# Patient Record
Sex: FEMALE | Race: WHITE | NOT HISPANIC OR LATINO | Employment: FULL TIME | URBAN - METROPOLITAN AREA
[De-identification: names, ages, dates, MRNs, and addresses within clinical notes are randomized per-mention and may not be internally consistent; named-entity substitution may affect disease eponyms.]

---

## 2017-06-30 ENCOUNTER — TELEPHONE (OUTPATIENT)
Dept: PULMONOLOGY | Facility: CLINIC | Age: 37
End: 2017-06-30

## 2017-06-30 NOTE — TELEPHONE ENCOUNTER
----- Message from Lilian Alcaraz sent at 6/30/2017 11:22 AM CDT -----  Contact: pt  She's coming in for sleep lab, please schedule xray and PFT in needed. Please call pt at [phone]. Thank you

## 2017-07-12 ENCOUNTER — OFFICE VISIT (OUTPATIENT)
Dept: PULMONOLOGY | Facility: CLINIC | Age: 37
End: 2017-07-12
Payer: COMMERCIAL

## 2017-07-12 VITALS
DIASTOLIC BLOOD PRESSURE: 68 MMHG | HEIGHT: 64 IN | BODY MASS INDEX: 28.83 KG/M2 | WEIGHT: 168.88 LBS | HEART RATE: 60 BPM | OXYGEN SATURATION: 99 % | RESPIRATION RATE: 20 BRPM | SYSTOLIC BLOOD PRESSURE: 100 MMHG

## 2017-07-12 DIAGNOSIS — Z87.19 HISTORY OF GASTROESOPHAGEAL REFLUX (GERD): ICD-10-CM

## 2017-07-12 DIAGNOSIS — Z98.890 HISTORY OF NISSEN FUNDOPLICATION: ICD-10-CM

## 2017-07-12 DIAGNOSIS — K21.9 GASTROESOPHAGEAL REFLUX DISEASE, ESOPHAGITIS PRESENCE NOT SPECIFIED: Primary | ICD-10-CM

## 2017-07-12 PROCEDURE — 99204 OFFICE O/P NEW MOD 45 MIN: CPT | Mod: S$GLB,,, | Performed by: NURSE PRACTITIONER

## 2017-07-12 PROCEDURE — 99999 PR PBB SHADOW E&M-EST. PATIENT-LVL IV: CPT | Mod: PBBFAC,,, | Performed by: NURSE PRACTITIONER

## 2017-07-12 RX ORDER — OMEPRAZOLE 40 MG/1
40 CAPSULE, DELAYED RELEASE ORAL DAILY
Qty: 30 CAPSULE | Refills: 3 | Status: SHIPPED | OUTPATIENT
Start: 2017-07-12 | End: 2021-11-17 | Stop reason: ALTCHOICE

## 2017-07-12 NOTE — PATIENT INSTRUCTIONS
Tips to Control Acid Reflux    To control acid reflux, youll need to make some basic diet and lifestyle changes. The simple steps outlined below may be all youll need to ease discomfort.  Watch what you eat  · Avoid fatty foods and spicy foods.  · Eat fewer acidic foods, such as citrus and tomato-based foods. These can increase symptoms.  · Limit drinking alcohol, caffeine, and fizzy beverages. All increase acid reflux.  · Try limiting chocolate, peppermint, and spearmint. These can worsen acid reflux in some people.  Watch when you eat  · Avoid lying down for 3 hours after eating.  · Do not snack before going to bed.  Raise your head  Raising your head and upper body by 4 to 6 inches helps limit reflux when youre lying down. Put blocks under the head of your bed frame to raise it.  Other changes  · Lose weight, if you need to  · Dont exercise near bedtime  · Avoid tight-fitting clothes  · Limit aspirin and ibuprofen  · Stop smoking   Date Last Reviewed: 7/1/2016 © 2000-2016 [employer]. [address] [place] [address]. All rights reserved. This information is not intended as a substitute for professional medical care. Always follow your healthcare professional's instructions.        Discharge Instructions for Gastroesophageal Reflux Disease (GERD)  Gastroesophageal reflux disease (GERD) is a backflow of acid from the stomach into the swallowing tube (esophagus).  Home care  These home care steps can help you manage GERD:  · Maintain a healthy weight. Get help to lose any extra pounds.  · Avoid lying down after meals.  · Avoid eating late at night.  · Elevate the head of your bed by 6 inches. You can do this by placing wooden blocks or bed risers under the head of your bed.  · Avoid wearing tight-fitting clothes.  · Avoid foods that might irritate your stomach, such as the following:  ¨ Alcohol  ¨ Fat  ¨ Chocolate  ¨ Caffeine  ¨ Spearmint or peppermint  · Talk to your healthcare  provider if you are taking any of the following medicines. These medicines can make GERD symptoms worse:  ¨ Calcium channel blockers  ¨ Theophylline  ¨ Anticholinergic medicines, such as oxybutynin and benzatropine  · Begin an exercise program. Ask your healthcare provider how to get started. You can benefit from simple activities, such as walking or gardening.  · Break the smoking habit. Enroll in a stop-smoking program to improve your chances of success.  · Limit alcohol intake to no more than 2 drinks a day.  · Take your medicines exactly as directed. Dont skip doses.  · Avoid over-the-counter nonsteroidal anti-inflammatory medicines, such as aspirin and ibuprofen, unless recommended by your healthcare provider for certain conditions.   · If possible, avoid nitrates (heart medicines, such as nitroglycerin and isosorbide dinitrate ).  Follow-up care  Make a follow-up appointment as directed by our staff.     When to call the healthcare provider  Call your healthcare provider immediately if you have any of the following:  · Trouble swallowing  · Pain when swallowing  · Feeling of food caught in your chest or throat  · Pain in the neck, chest, or back  · Heartburn that causes you to vomit  · Vomiting blood  · Black or tarry stools (from digested blood)  · More saliva (watering of the mouth) than usual  · Weight loss of more than 3% to 5% of your total body weight in a month  · Hoarseness or sore throat that wont go away  · Choking, coughing, or wheezing   Date Last Reviewed: 7/1/2016  © 7926-0760 [employer]. [address] [place] [address], [place] [address]. All rights reserved. This information is not intended as a substitute for professional medical care. Always follow your healthcare professional's instructions.

## 2017-07-12 NOTE — PROGRESS NOTES
Subjective:      Patient ID: Anahy Mata is a 37 y.o. female.    Patient Active Problem List   Diagnosis    History of gastroesophageal reflux (GERD)     Problem list has been reviewed.    she has been referred by Self, [facility] for evaluation and management for   Chief Complaint   Patient presents with    Sleep Apnea     Chief Complaint: Sleep Apnea    HPI:  She presents for a sleep evaluation patient reports she is having a sensation of fullness or something stuck in the back of her throat during the night over the past 3 months.  She awakens several times during the night most common time is around 2-3 am when sensation of something in her throat, when she drinks water there is some relief.   Her awakens are from 0-3 times a night.  There is history of Nissen fundoplication in about 2005 for severe GERD.   She is not having burning sensation when foreign body in back of throat.  She actually reaches a finger in back of throat thinking there is something to remove, drinks water and relieves sensation in back of throat.   No sensation of post nasal drip, no rhinorrhea. There is no cough, no wheezing, no shortness of breath, no sputum production, no chest pain.  No snoring or gasping for air at night.  She presents self referred since friends and coworkers directed her to sleep evaluation.  She has not seen her Primary Care Provider for physical in a long time.   Patient reports non restful' sleep.  She denies morning headache.   Shedenies impairment of activity during wakefulness.  She denies day time napping.  [place] sleepiness score was 4.  Neck circumference is 37 cm. (14 inches).  She denies recent weight gain.  Mallampati score 1  Cardiovascular risk factors: obesity  Bed time is 0930, 1000  Wake time is 0600  Sleep onset is within 30 Minutes.  Sleep maintenance difficulties related to frequent night time awakening and non-restful sleep over past 3 months with foreign body sensation in back of  throat  Wake after sleep onset occurs none to three times a night over past 3 months.  Nocturia occurs one time a night,   Sleep aids : No  Dry mouth : No,   Sleep walking: No,   Sleep talking : No,   Sleep eating:No  Vivid Dreams : No,   Cataplexy : No,   Hypnogogic hallucinations:  No    STOP - BANG Questionnaire:     1. Snoring : Do you snore loudly ?    No  2. Tired : Do you often feel tired, fatigued, or sleepy during daytime? No  3. Observed: Has anyone observed you stop breathing during your sleep?    No   4. Blood pressure : Do you have or are you being treated for high blood pressure?   No  5. BMI :BMI more than 35 kg/m2?   No  6. Age : Age over 50 yr old?   No  7. Neck circumference: Neck circumference greater than 40 cm?   No  8. Gender: Gender male?   No    SCORE: 0    High risk of SANJU: Yes 5 - 8  Intermediate risk of SANJU: Yes 3 - 4  Low risk of SANJU: Yes 0 - 2    Previous Report Reviewed: historical medical records, lab reports and office notes     Past Medical History: The following portions of the patient's history were reviewed and updated as appropriate:   She  has a past surgical history that includes Tubal ligation; Cholecystectomy; and Nissen fundoplication (2005).  Her family history includes Hypertension in her mother; No Known Problems in her father.  She  reports that she quit smoking about 6 years ago. She has never used smokeless tobacco. She reports that she does not drink alcohol or use drugs.  She has a current medication list which includes the following prescription(s): omeprazole.  She has No Known Allergies..    Review of Systems   Constitutional: Negative for fever, chills, weight loss, weight gain, activity change, appetite change, fatigue and night sweats.   HENT: Negative for postnasal drip, rhinorrhea, sinus pressure, voice change and congestion.    Eyes: Negative for redness and itching.   Respiratory: Negative for snoring, cough, sputum production, chest tightness, shortness of  "breath, wheezing, orthopnea, asthma nighttime symptoms, dyspnea on extertion, use of rescue inhaler and somnolence.    Cardiovascular: Negative for chest pain, palpitations and leg swelling.   Genitourinary: Negative for difficulty urinating and hematuria.   Endocrine: Negative for polydipsia, polyphagia, cold intolerance, heat intolerance and polyuria.    Musculoskeletal: Negative for arthralgias, back pain, gait problem, joint swelling and myalgias.   Skin: Negative.    Neurological: Negative for dizziness, weakness, light-headedness and headaches.   Hematological: Negative for adenopathy. No excessive bruising.   Psychiatric/Behavioral: Negative for sleep disturbance. The patient is not nervous/anxious.       Objective:     Physical Exam   Constitutional: She is oriented to person, place, and time. Vital signs are normal. She appears well-developed and well-nourished. She is active and cooperative.  Non-toxic appearance. She does not appear ill. No distress.   HENT:   Head: Normocephalic.   Right Ear: External ear normal.   Left Ear: External ear normal.   Nose: Nose normal.   Mouth/Throat: Oropharynx is clear and moist. No oropharyngeal exudate.   Eyes: Conjunctivae are normal.   Neck: Normal range of motion. Neck supple.   Cardiovascular: Normal rate, regular rhythm, normal heart sounds and intact distal pulses.    Pulmonary/Chest: Effort normal and breath sounds normal.   Abdominal: Soft. There is no tenderness.   Musculoskeletal: Normal range of motion.   Neurological: She is alert and oriented to person, place, and time.   Skin: Skin is warm and dry.   Psychiatric: She has a normal mood and affect. Her behavior is normal. Judgment and thought content normal.   Vitals reviewed.    Vitals:    07/12/17 1505   BP: 100/68   Pulse: 60   Resp: 20   SpO2: 99%   Weight: 76.6 kg (168 lb 14 oz)   Height: 5' 3.6" (1.615 m)     Body mass index is 29.35 kg/m².    Personal Diagnostic Review  none    Assessment:     1. " Gastroesophageal reflux disease, esophagitis presence not specified    2. History of gastroesophageal reflux (GERD)    3. History of Nissen fundoplication      Orders Placed This Encounter   Procedures    Ambulatory referral to Gastroenterology     Referral Priority:   Routine     Referral Type:   Consultation     Referral Reason:   Specialty Services Required     Requested Specialty:   Gastroenterology     Number of Visits Requested:   1     Plan:     Discussed diagnosis, its evaluation, treatment and usual course. All questions answered.    Gastroesophageal reflux disease, esophagitis presence not specified  -     omeprazole (PRILOSEC) 40 MG capsule; Take 1 capsule (40 mg total) by mouth once daily.  Dispense: 30 capsule; Refill: 3  -     Ambulatory referral to Gastroenterology    History of gastroesophageal reflux (GERD)  -     Ambulatory referral to Gastroenterology    History of Nissen fundoplication  -     Ambulatory referral to Gastroenterology       TIME SPENT WITH PATIENT: Time spent:40 minutes in face to face  discussion concerning diagnosis, prognosis, review of lab and test results, benefits of treatment as well as management of disease, counseling of patient and coordination of care between various health  care providers . Greater than half the time spent was used for coordination of care and counseling of patient.     Patient is low risk for sleep apnea, she is symptomatic for reoccurrence of gerd.   She is to begin Prilosec daily, follow up with gastro.  If not improving with treatment over next 5-7 days with prilosec and other conservative treatment for GERD then she is to begin Flonase and Zyrtec to determine if postnasal drip as a cause for her lump sensation in her throat at night.    Return if symptoms worsen or fail to improve.

## 2019-05-29 ENCOUNTER — OCCUPATIONAL HEALTH (OUTPATIENT)
Dept: URGENT CARE | Facility: CLINIC | Age: 39
End: 2019-05-29

## 2019-05-29 DIAGNOSIS — Z02.83 ENCOUNTER FOR DRUG SCREENING: Primary | ICD-10-CM

## 2019-05-29 PROCEDURE — 80305 OOH COLLECTION ONLY DRUG SCREEN: ICD-10-PCS | Mod: S$GLB,,, | Performed by: PHYSICIAN ASSISTANT

## 2019-05-29 PROCEDURE — 80305 DRUG TEST PRSMV DIR OPT OBS: CPT | Mod: S$GLB,,, | Performed by: PHYSICIAN ASSISTANT

## 2021-11-17 ENCOUNTER — OFFICE VISIT (OUTPATIENT)
Dept: INTERNAL MEDICINE | Facility: CLINIC | Age: 41
End: 2021-11-17
Payer: COMMERCIAL

## 2021-11-17 DIAGNOSIS — J01.00 ACUTE NON-RECURRENT MAXILLARY SINUSITIS: Primary | ICD-10-CM

## 2021-11-17 PROCEDURE — 99213 OFFICE O/P EST LOW 20 MIN: CPT | Mod: 95,,, | Performed by: NURSE PRACTITIONER

## 2021-11-17 PROCEDURE — 1159F MED LIST DOCD IN RCRD: CPT | Mod: CPTII,95,, | Performed by: NURSE PRACTITIONER

## 2021-11-17 PROCEDURE — 1160F RVW MEDS BY RX/DR IN RCRD: CPT | Mod: CPTII,95,, | Performed by: NURSE PRACTITIONER

## 2021-11-17 PROCEDURE — 1160F PR REVIEW ALL MEDS BY PRESCRIBER/CLIN PHARMACIST DOCUMENTED: ICD-10-PCS | Mod: CPTII,95,, | Performed by: NURSE PRACTITIONER

## 2021-11-17 PROCEDURE — 99213 PR OFFICE/OUTPT VISIT, EST, LEVL III, 20-29 MIN: ICD-10-PCS | Mod: 95,,, | Performed by: NURSE PRACTITIONER

## 2021-11-17 PROCEDURE — 1159F PR MEDICATION LIST DOCUMENTED IN MEDICAL RECORD: ICD-10-PCS | Mod: CPTII,95,, | Performed by: NURSE PRACTITIONER

## 2021-11-17 RX ORDER — AMOXICILLIN AND CLAVULANATE POTASSIUM 875; 125 MG/1; MG/1
1 TABLET, FILM COATED ORAL EVERY 12 HOURS
Qty: 14 TABLET | Refills: 0 | Status: SHIPPED | OUTPATIENT
Start: 2021-11-17 | End: 2021-11-24

## 2021-11-17 RX ORDER — METHYLPREDNISOLONE 4 MG/1
TABLET ORAL
Qty: 21 TABLET | Refills: 0 | Status: SHIPPED | OUTPATIENT
Start: 2021-11-17 | End: 2022-10-18

## 2022-02-11 ENCOUNTER — TELEPHONE (OUTPATIENT)
Dept: OTOLARYNGOLOGY | Facility: CLINIC | Age: 42
End: 2022-02-11
Payer: COMMERCIAL

## 2022-02-21 PROBLEM — J01.00 ACUTE NON-RECURRENT MAXILLARY SINUSITIS: Status: RESOLVED | Noted: 2021-11-17 | Resolved: 2022-02-21

## 2022-10-18 ENCOUNTER — OFFICE VISIT (OUTPATIENT)
Dept: INTERNAL MEDICINE | Facility: CLINIC | Age: 42
End: 2022-10-18
Payer: COMMERCIAL

## 2022-10-18 DIAGNOSIS — J32.9 SINUSITIS, UNSPECIFIED CHRONICITY, UNSPECIFIED LOCATION: Primary | ICD-10-CM

## 2022-10-18 PROCEDURE — 99213 PR OFFICE/OUTPT VISIT, EST, LEVL III, 20-29 MIN: ICD-10-PCS | Mod: 95,,, | Performed by: NURSE PRACTITIONER

## 2022-10-18 PROCEDURE — 99213 OFFICE O/P EST LOW 20 MIN: CPT | Mod: 95,,, | Performed by: NURSE PRACTITIONER

## 2022-10-18 RX ORDER — FLUTICASONE PROPIONATE 50 MCG
2 SPRAY, SUSPENSION (ML) NASAL DAILY
Qty: 16 G | Refills: 0 | Status: SHIPPED | OUTPATIENT
Start: 2022-10-18 | End: 2022-11-15

## 2022-10-18 RX ORDER — METHYLPREDNISOLONE 4 MG/1
TABLET ORAL
Qty: 21 TABLET | Refills: 0 | Status: SHIPPED | OUTPATIENT
Start: 2022-10-18 | End: 2022-11-15

## 2022-10-18 RX ORDER — AMOXICILLIN 875 MG/1
875 TABLET, FILM COATED ORAL 2 TIMES DAILY
Qty: 20 TABLET | Refills: 0 | Status: SHIPPED | OUTPATIENT
Start: 2022-10-18 | End: 2022-10-28

## 2022-10-18 NOTE — PROGRESS NOTES
Subjective:       Patient ID: Anahy Mata is a 42 y.o. female.    Chief Complaint: URI    The patient location is: home  The chief complaint leading to consultation is: uri    Visit type: audiovisual    Face to Face time with patient: 15 min  20 minutes of total time spent on the encounter, which includes face to face time and non-face to face time preparing to see the patient (eg, review of tests), Obtaining and/or reviewing separately obtained history, Documenting clinical information in the electronic or other health record, Independently interpreting results (not separately reported) and communicating results to the patient/family/caregiver, or Care coordination (not separately reported).         Each patient to whom he or she provides medical services by telemedicine is:  (1) informed of the relationship between the physician and patient and the respective role of any other health care provider with respect to management of the patient; and (2) notified that he or she may decline to receive medical services by telemedicine and may withdraw from such care at any time.    Notes:   Patient doing virtual visit for Upper Respiratory Infection x 11 days  Has been taking otc cold med  Having left ear pain, worsening 4/10  Has post nasal drip   Does report some sinus pain  She is taking alkaseltzer cold and sinus, ibuprofen  Occasional dry cough      URI   Associated symptoms include coughing, ear pain, headaches, rhinorrhea and a sore throat. Pertinent negatives include no abdominal pain, diarrhea, neck pain, rash or vomiting.   Otalgia   There is pain in the left ear. This is a new problem. The current episode started in the past 7 days. The problem occurs every few minutes. The problem has been waxing and waning. There has been no fever. The fever has been present for Less than 1 day. The pain is at a severity of 4/10. The pain is mild. Associated symptoms include coughing, headaches, rhinorrhea and a sore  throat. Pertinent negatives include no abdominal pain, diarrhea, drainage, ear discharge, hearing loss, neck pain, rash or vomiting. She has tried NSAIDs and acetaminophen for the symptoms. The treatment provided mild relief. There is no history of a chronic ear infection, hearing loss or a tympanostomy tube.     There were no vitals taken for this visit.    Review of Systems   HENT:  Positive for ear pain, rhinorrhea and sore throat. Negative for ear discharge and hearing loss.    Respiratory:  Positive for cough.    Gastrointestinal:  Negative for abdominal pain, diarrhea and vomiting.   Musculoskeletal:  Negative for neck pain.   Skin:  Negative for rash.   Neurological:  Positive for headaches.     Objective:      Physical Exam  Constitutional:       General: She is not in acute distress.     Appearance: Normal appearance. She is well-developed. She is not diaphoretic.   HENT:      Head: Normocephalic and atraumatic.      Right Ear: External ear normal.      Left Ear: External ear normal.   Eyes:      General: No scleral icterus.        Right eye: No discharge.         Left eye: No discharge.      Conjunctiva/sclera: Conjunctivae normal.   Pulmonary:      Effort: Pulmonary effort is normal. No tachypnea, accessory muscle usage or respiratory distress.      Breath sounds: No stridor.   Musculoskeletal:      Cervical back: Normal range of motion and neck supple.   Skin:     Findings: No rash.   Neurological:      Mental Status: She is alert. She is not disoriented.   Psychiatric:         Attention and Perception: She is attentive.         Mood and Affect: Mood normal. Mood is not anxious or depressed. Affect is not labile, blunt, angry or inappropriate.         Speech: Speech normal.         Behavior: Behavior normal.         Thought Content: Thought content normal.         Judgment: Judgment normal.       Assessment:       1. Sinusitis, unspecified chronicity, unspecified location        Plan:       Anahy arana  seen today for uri.    Diagnoses and all orders for this visit:    Sinusitis, unspecified chronicity, unspecified location  -     amoxicillin (AMOXIL) 875 MG tablet; Take 1 tablet (875 mg total) by mouth 2 (two) times daily. for 10 days  -     methylPREDNISolone (MEDROL DOSEPACK) 4 mg tablet; use as directed  -     fluticasone propionate (FLONASE) 50 mcg/actuation nasal spray; 2 sprays (100 mcg total) by Each Nostril route once daily.    Rest  Drink plenty of clear fluids  Nasal saline spray to clear nasal drainage and help with nasal congestion  Zyrtec or Claritin to help dry mucus and post nasal drip  Mucinex or Mucinex DM for cough and chest congestion  Tylenol or Ibuprofen for fever, headache and body aches  Warm salt water gargles for throat comfort  Chloraseptic spray or lozenges for throat comfort  See Primary Care Physician or go to ER if symptoms worsen of fail to improve with treatment.

## 2022-11-15 ENCOUNTER — OFFICE VISIT (OUTPATIENT)
Dept: INTERNAL MEDICINE | Facility: CLINIC | Age: 42
End: 2022-11-15
Payer: COMMERCIAL

## 2022-11-15 DIAGNOSIS — H92.02 LEFT EAR PAIN: Primary | ICD-10-CM

## 2022-11-15 PROCEDURE — 99213 PR OFFICE/OUTPT VISIT, EST, LEVL III, 20-29 MIN: ICD-10-PCS | Mod: 95,,, | Performed by: NURSE PRACTITIONER

## 2022-11-15 PROCEDURE — 1159F MED LIST DOCD IN RCRD: CPT | Mod: CPTII,95,, | Performed by: NURSE PRACTITIONER

## 2022-11-15 PROCEDURE — 1160F PR REVIEW ALL MEDS BY PRESCRIBER/CLIN PHARMACIST DOCUMENTED: ICD-10-PCS | Mod: CPTII,95,, | Performed by: NURSE PRACTITIONER

## 2022-11-15 PROCEDURE — 1159F PR MEDICATION LIST DOCUMENTED IN MEDICAL RECORD: ICD-10-PCS | Mod: CPTII,95,, | Performed by: NURSE PRACTITIONER

## 2022-11-15 PROCEDURE — 99213 OFFICE O/P EST LOW 20 MIN: CPT | Mod: 95,,, | Performed by: NURSE PRACTITIONER

## 2022-11-15 PROCEDURE — 1160F RVW MEDS BY RX/DR IN RCRD: CPT | Mod: CPTII,95,, | Performed by: NURSE PRACTITIONER

## 2022-11-15 RX ORDER — AMOXICILLIN AND CLAVULANATE POTASSIUM 875; 125 MG/1; MG/1
1 TABLET, FILM COATED ORAL EVERY 12 HOURS
Qty: 14 TABLET | Refills: 0 | Status: SHIPPED | OUTPATIENT
Start: 2022-11-15 | End: 2022-11-22

## 2022-11-15 NOTE — PROGRESS NOTES
The patient location is: in [place] at home  The chief complaint leading to consultation is: left ear pain    Visit type: audiovisual    Face to Face time with patient: 10 minutes  13 minutes of total time spent on the encounter, which includes face to face time and non-face to face time preparing to see the patient (eg, review of tests), Obtaining and/or reviewing separately obtained history, Documenting clinical information in the electronic or other health record, Independently interpreting results (not separately reported) and communicating results to the patient/family/caregiver, or Care coordination (not separately reported).     Each patient to whom he or she provides medical services by telemedicine is:  (1) informed of the relationship between the physician and patient and the respective role of any other health care provider with respect to management of the patient; and (2) notified that he or she may decline to receive medical services by telemedicine and may withdraw from such care at any time.    Subjective:       Patient ID: Anahy Mata is a 42 y.o. female.    Chief Complaint: No chief complaint on file.    Mrs. Mata is seen via telemedicine for c/o right ear pain x 2 days with low grad temp 100.6. Denies ear drainage or pain with outer ear movement. Also reports mild headache and rhinorrhea. Reports symptoms are similar to previous ear infections.        Otalgia   There is pain in the left ear. This is a new problem. The current episode started yesterday. The problem occurs every few minutes. The problem has been unchanged. The maximum temperature recorded prior to her arrival was 100.4 - 100.9 F. The fever has been present for Less than 1 day. The pain is at a severity of 4/10. The pain is moderate. Associated symptoms include headaches and rhinorrhea. Pertinent negatives include no abdominal pain, coughing, diarrhea, drainage, ear discharge, hearing loss, neck pain, rash, sore throat  or vomiting. She has tried NSAIDs, cold packs and heat packs for the symptoms. The treatment provided no relief. There is no history of a chronic ear infection, hearing loss or a tympanostomy tube.     Patient Active Problem List   Diagnosis    History of gastroesophageal reflux (GERD)       Family History   Problem Relation Age of Onset    Hypertension Mother     No Known Problems Father      Past Surgical History:   Procedure Laterality Date    CHOLECYSTECTOMY      NISSEN FUNDOPLICATION  2005    TUBAL LIGATION           Current Outpatient Medications:     amoxicillin-clavulanate 875-125mg (AUGMENTIN) 875-125 mg per tablet, Take 1 tablet by mouth every 12 (twelve) hours. for 7 days, Disp: 14 tablet, Rfl: 0    Review of Systems   Constitutional:  Positive for fever. Negative for appetite change, chills and fatigue.   HENT:  Positive for ear pain and rhinorrhea. Negative for congestion, drooling, ear discharge, facial swelling, hearing loss, sinus pressure, sinus pain, sore throat and trouble swallowing.    Respiratory:  Negative for cough and shortness of breath.    Cardiovascular:  Negative for chest pain.   Gastrointestinal:  Negative for abdominal pain, diarrhea and vomiting.   Musculoskeletal:  Negative for myalgias and neck pain.   Skin:  Negative for rash.   Neurological:  Positive for headaches. Negative for weakness.   Hematological:  Negative for adenopathy.     Objective:   There were no vitals taken for this visit.     Physical Exam  Constitutional:       General: She is not in acute distress.     Appearance: Normal appearance. She is not ill-appearing.   HENT:      Head: Normocephalic and atraumatic.      Right Ear: External ear normal.      Left Ear: External ear normal.   Eyes:      Conjunctiva/sclera: Conjunctivae normal.   Pulmonary:      Effort: Pulmonary effort is normal. No respiratory distress.   Neurological:      Mental Status: She is alert and oriented to person, place, and time.  "  Psychiatric:         Mood and Affect: Mood normal.         Behavior: Behavior normal.       Assessment & Plan     Problem List Items Addressed This Visit    None  Visit Diagnoses       Left ear pain    -  Primary    Unable to complete full exam d/t virtual visit. Will treatment for acute otitis media d/t fever and pain. Augmentin rx. OTC analgesics PRN.     Relevant Medications    amoxicillin-clavulanate 875-125mg (AUGMENTIN) 875-125 mg per tablet          RTC in person if symptoms do not improve or worsen    DELILAH Valencia      Portions of this note may have been created with voice recognition software. Occasional "wrong-word" or "sound-a-like" substitutions may have occurred due to the inherent limitations of voice recognition software. Please, read the note carefully and recognize, using context, where substitutions have occurred.     "

## 2023-03-21 ENCOUNTER — LAB VISIT (OUTPATIENT)
Dept: LAB | Facility: HOSPITAL | Age: 43
End: 2023-03-21
Attending: NURSE PRACTITIONER
Payer: COMMERCIAL

## 2023-03-21 ENCOUNTER — OFFICE VISIT (OUTPATIENT)
Dept: OBSTETRICS AND GYNECOLOGY | Facility: CLINIC | Age: 43
End: 2023-03-21
Payer: COMMERCIAL

## 2023-03-21 VITALS
BODY MASS INDEX: 29.28 KG/M2 | RESPIRATION RATE: 18 BRPM | DIASTOLIC BLOOD PRESSURE: 65 MMHG | SYSTOLIC BLOOD PRESSURE: 105 MMHG | WEIGHT: 168.44 LBS

## 2023-03-21 DIAGNOSIS — Z12.31 ENCOUNTER FOR SCREENING MAMMOGRAM FOR MALIGNANT NEOPLASM OF BREAST: ICD-10-CM

## 2023-03-21 DIAGNOSIS — Z01.419 ROUTINE GYNECOLOGICAL EXAMINATION: Primary | ICD-10-CM

## 2023-03-21 DIAGNOSIS — R61 NIGHT SWEATS: ICD-10-CM

## 2023-03-21 DIAGNOSIS — Z12.4 PAPANICOLAOU SMEAR FOR CERVICAL CANCER SCREENING: ICD-10-CM

## 2023-03-21 LAB
BASOPHILS # BLD AUTO: 0.05 K/UL (ref 0–0.2)
BASOPHILS NFR BLD: 0.6 % (ref 0–1.9)
DIFFERENTIAL METHOD: ABNORMAL
EOSINOPHIL # BLD AUTO: 0.1 K/UL (ref 0–0.5)
EOSINOPHIL NFR BLD: 0.6 % (ref 0–8)
ERYTHROCYTE [DISTWIDTH] IN BLOOD BY AUTOMATED COUNT: 13 % (ref 11.5–14.5)
ESTIMATED AVG GLUCOSE: 100 MG/DL (ref 68–131)
HBA1C MFR BLD: 5.1 % (ref 4–5.6)
HCT VFR BLD AUTO: 46.1 % (ref 37–48.5)
HGB BLD-MCNC: 15.2 G/DL (ref 12–16)
IMM GRANULOCYTES # BLD AUTO: 0.02 K/UL (ref 0–0.04)
IMM GRANULOCYTES NFR BLD AUTO: 0.3 % (ref 0–0.5)
LYMPHOCYTES # BLD AUTO: 1.7 K/UL (ref 1–4.8)
LYMPHOCYTES NFR BLD: 21.8 % (ref 18–48)
MCH RBC QN AUTO: 31.7 PG (ref 27–31)
MCHC RBC AUTO-ENTMCNC: 33 G/DL (ref 32–36)
MCV RBC AUTO: 96 FL (ref 82–98)
MONOCYTES # BLD AUTO: 0.8 K/UL (ref 0.3–1)
MONOCYTES NFR BLD: 9.5 % (ref 4–15)
NEUTROPHILS # BLD AUTO: 5.3 K/UL (ref 1.8–7.7)
NEUTROPHILS NFR BLD: 67.2 % (ref 38–73)
NRBC BLD-RTO: 0 /100 WBC
PLATELET # BLD AUTO: 328 K/UL (ref 150–450)
PMV BLD AUTO: 11.4 FL (ref 9.2–12.9)
RBC # BLD AUTO: 4.8 M/UL (ref 4–5.4)
WBC # BLD AUTO: 7.9 K/UL (ref 3.9–12.7)

## 2023-03-21 PROCEDURE — 88175 CYTOPATH C/V AUTO FLUID REDO: CPT | Performed by: NURSE PRACTITIONER

## 2023-03-21 PROCEDURE — 1160F PR REVIEW ALL MEDS BY PRESCRIBER/CLIN PHARMACIST DOCUMENTED: ICD-10-PCS | Mod: CPTII,S$GLB,, | Performed by: NURSE PRACTITIONER

## 2023-03-21 PROCEDURE — 3078F PR MOST RECENT DIASTOLIC BLOOD PRESSURE < 80 MM HG: ICD-10-PCS | Mod: CPTII,S$GLB,, | Performed by: NURSE PRACTITIONER

## 2023-03-21 PROCEDURE — 85025 COMPLETE CBC W/AUTO DIFF WBC: CPT | Performed by: NURSE PRACTITIONER

## 2023-03-21 PROCEDURE — 87624 HPV HI-RISK TYP POOLED RSLT: CPT | Performed by: NURSE PRACTITIONER

## 2023-03-21 PROCEDURE — 99999 PR PBB SHADOW E&M-EST. PATIENT-LVL III: ICD-10-PCS | Mod: PBBFAC,,, | Performed by: NURSE PRACTITIONER

## 2023-03-21 PROCEDURE — 83001 ASSAY OF GONADOTROPIN (FSH): CPT | Performed by: NURSE PRACTITIONER

## 2023-03-21 PROCEDURE — 83036 HEMOGLOBIN GLYCOSYLATED A1C: CPT | Performed by: NURSE PRACTITIONER

## 2023-03-21 PROCEDURE — 3008F BODY MASS INDEX DOCD: CPT | Mod: CPTII,S$GLB,, | Performed by: NURSE PRACTITIONER

## 2023-03-21 PROCEDURE — 3078F DIAST BP <80 MM HG: CPT | Mod: CPTII,S$GLB,, | Performed by: NURSE PRACTITIONER

## 2023-03-21 PROCEDURE — 82627 DEHYDROEPIANDROSTERONE: CPT | Performed by: NURSE PRACTITIONER

## 2023-03-21 PROCEDURE — 1160F RVW MEDS BY RX/DR IN RCRD: CPT | Mod: CPTII,S$GLB,, | Performed by: NURSE PRACTITIONER

## 2023-03-21 PROCEDURE — 80053 COMPREHEN METABOLIC PANEL: CPT | Performed by: NURSE PRACTITIONER

## 2023-03-21 PROCEDURE — 1159F MED LIST DOCD IN RCRD: CPT | Mod: CPTII,S$GLB,, | Performed by: NURSE PRACTITIONER

## 2023-03-21 PROCEDURE — 1159F PR MEDICATION LIST DOCUMENTED IN MEDICAL RECORD: ICD-10-PCS | Mod: CPTII,S$GLB,, | Performed by: NURSE PRACTITIONER

## 2023-03-21 PROCEDURE — 99386 PR PREVENTIVE VISIT,NEW,40-64: ICD-10-PCS | Mod: S$GLB,,, | Performed by: NURSE PRACTITIONER

## 2023-03-21 PROCEDURE — 99386 PREV VISIT NEW AGE 40-64: CPT | Mod: S$GLB,,, | Performed by: NURSE PRACTITIONER

## 2023-03-21 PROCEDURE — 82530 CORTISOL FREE: CPT | Performed by: NURSE PRACTITIONER

## 2023-03-21 PROCEDURE — 99999 PR PBB SHADOW E&M-EST. PATIENT-LVL III: CPT | Mod: PBBFAC,,, | Performed by: NURSE PRACTITIONER

## 2023-03-21 PROCEDURE — 3074F SYST BP LT 130 MM HG: CPT | Mod: CPTII,S$GLB,, | Performed by: NURSE PRACTITIONER

## 2023-03-21 PROCEDURE — 3008F PR BODY MASS INDEX (BMI) DOCUMENTED: ICD-10-PCS | Mod: CPTII,S$GLB,, | Performed by: NURSE PRACTITIONER

## 2023-03-21 PROCEDURE — 3074F PR MOST RECENT SYSTOLIC BLOOD PRESSURE < 130 MM HG: ICD-10-PCS | Mod: CPTII,S$GLB,, | Performed by: NURSE PRACTITIONER

## 2023-03-21 NOTE — PROGRESS NOTES
Subjective:       Patient ID: Anahy Mata is a 43 y.o. female.    Chief Complaint:  Well Woman      History of Present Illness  HPI  Presents today for WWE    Health Maintenance   Topic Date Due    Hepatitis C Screening  Never done    TETANUS VACCINE  Never done    Mammogram  Never done    Lipid Panel  Completed     GYN & OB History  Patient's last menstrual period was 03/14/2023 (exact date).   Date of Last Pap: today    OB History   No obstetric history on file.       Review of Systems  Review of Systems        Objective:   /65   Resp 18   Wt 76.4 kg (168 lb 6.9 oz)   LMP 03/14/2023 (Exact Date)   BMI 29.28 kg/m²    Physical Exam:   Constitutional: She is oriented to person, place, and time. She appears well-developed and well-nourished.        Pulmonary/Chest: Right breast exhibits no inverted nipple, no mass, no nipple discharge, no skin change, no tenderness and no swelling. Left breast exhibits no inverted nipple, no mass, no nipple discharge, no skin change, no tenderness and no swelling.        Abdominal: Soft.     Genitourinary:    Inguinal canal, vagina, right adnexa and left adnexa normal.      Pelvic exam was performed with patient supine.   The external female genitalia was normal.   Genitalia hair distrobution normal .   Labial bartholins normal.Cervix is normal. No no adexnal prolapse. Right adnexum displays no mass, no tenderness and no fullness. Left adnexum displays no mass, no tenderness and no fullness. No erythema,  no vaginal discharge, bleeding, rectocele, cystocele or unspecified prolapse of vaginal walls in the vagina.    No foreign body in the vagina.      No signs of injury in the vagina.      pap smear completed              Neurological: She is alert and oriented to person, place, and time.    Skin: Skin is warm and dry.    Psychiatric: She has a normal mood and affect. Her behavior is normal. Judgment and thought content normal.      Assessment:        1. Routine  gynecological examination    2. Night sweats    3. Papanicolaou smear for cervical cancer screening    4. Encounter for screening mammogram for malignant neoplasm of breast               Plan:           Anahy was seen today for well woman.    Diagnoses and all orders for this visit:    Routine gynecological examination    Night sweats  -     CBC W/ AUTO DIFFERENTIAL; Future  -     Hemoglobin A1C; Future  -     FOLLICLE STIMULATING HORMONE; Future  -     COMPREHENSIVE METABOLIC PANEL; Future  -     Cortisol, free, serum; Future  -     DHEA-Sulfate; Future    Papanicolaou smear for cervical cancer screening  -     Liquid-Based Pap Smear, Screening  -     HPV High Risk Genotypes, PCR    Encounter for screening mammogram for malignant neoplasm of breast  -     Mammo Digital Screening Bilat; Future      Recommend see endocrinologist if night sweats are not an ovarian issue

## 2023-03-22 LAB
ALBUMIN SERPL BCP-MCNC: 4.3 G/DL (ref 3.5–5.2)
ALP SERPL-CCNC: 69 U/L (ref 55–135)
ALT SERPL W/O P-5'-P-CCNC: 30 U/L (ref 10–44)
ANION GAP SERPL CALC-SCNC: 9 MMOL/L (ref 8–16)
AST SERPL-CCNC: 17 U/L (ref 10–40)
BILIRUB SERPL-MCNC: 0.4 MG/DL (ref 0.1–1)
BUN SERPL-MCNC: 10 MG/DL (ref 6–20)
CALCIUM SERPL-MCNC: 9.5 MG/DL (ref 8.7–10.5)
CHLORIDE SERPL-SCNC: 106 MMOL/L (ref 95–110)
CO2 SERPL-SCNC: 24 MMOL/L (ref 23–29)
CREAT SERPL-MCNC: 0.9 MG/DL (ref 0.5–1.4)
DHEA-S SERPL-MCNC: 316.2 UG/DL (ref 74.8–410.2)
EST. GFR  (NO RACE VARIABLE): >60 ML/MIN/1.73 M^2
FSH SERPL-ACNC: 26.31 MIU/ML
GLUCOSE SERPL-MCNC: 73 MG/DL (ref 70–110)
POTASSIUM SERPL-SCNC: 4.5 MMOL/L (ref 3.5–5.1)
PROT SERPL-MCNC: 7.3 G/DL (ref 6–8.4)
SODIUM SERPL-SCNC: 139 MMOL/L (ref 136–145)

## 2023-03-29 LAB — CORTIS F SERPL-MCNC: 0.47 MCG/DL

## 2023-03-30 ENCOUNTER — OFFICE VISIT (OUTPATIENT)
Dept: UROLOGY | Facility: CLINIC | Age: 43
End: 2023-03-30
Payer: COMMERCIAL

## 2023-03-30 VITALS
HEIGHT: 63 IN | DIASTOLIC BLOOD PRESSURE: 76 MMHG | HEART RATE: 101 BPM | WEIGHT: 170.75 LBS | BODY MASS INDEX: 30.25 KG/M2 | TEMPERATURE: 98 F | SYSTOLIC BLOOD PRESSURE: 110 MMHG | RESPIRATION RATE: 18 BRPM

## 2023-03-30 DIAGNOSIS — N30.00 ACUTE CYSTITIS WITHOUT HEMATURIA: Primary | ICD-10-CM

## 2023-03-30 PROCEDURE — 81002 URINALYSIS NONAUTO W/O SCOPE: CPT | Mod: S$GLB,,, | Performed by: NURSE PRACTITIONER

## 2023-03-30 PROCEDURE — 3074F PR MOST RECENT SYSTOLIC BLOOD PRESSURE < 130 MM HG: ICD-10-PCS | Mod: CPTII,S$GLB,, | Performed by: NURSE PRACTITIONER

## 2023-03-30 PROCEDURE — 51798 PR MEAS,POST-VOID RES,US,NON-IMAGING: ICD-10-PCS | Mod: S$GLB,,, | Performed by: NURSE PRACTITIONER

## 2023-03-30 PROCEDURE — 51798 US URINE CAPACITY MEASURE: CPT | Mod: S$GLB,,, | Performed by: NURSE PRACTITIONER

## 2023-03-30 PROCEDURE — 3078F PR MOST RECENT DIASTOLIC BLOOD PRESSURE < 80 MM HG: ICD-10-PCS | Mod: CPTII,S$GLB,, | Performed by: NURSE PRACTITIONER

## 2023-03-30 PROCEDURE — 99204 OFFICE O/P NEW MOD 45 MIN: CPT | Mod: S$GLB,,, | Performed by: NURSE PRACTITIONER

## 2023-03-30 PROCEDURE — 87186 SC STD MICRODIL/AGAR DIL: CPT | Performed by: NURSE PRACTITIONER

## 2023-03-30 PROCEDURE — 87086 URINE CULTURE/COLONY COUNT: CPT | Performed by: NURSE PRACTITIONER

## 2023-03-30 PROCEDURE — 3008F BODY MASS INDEX DOCD: CPT | Mod: CPTII,S$GLB,, | Performed by: NURSE PRACTITIONER

## 2023-03-30 PROCEDURE — 3044F HG A1C LEVEL LT 7.0%: CPT | Mod: CPTII,S$GLB,, | Performed by: NURSE PRACTITIONER

## 2023-03-30 PROCEDURE — 3078F DIAST BP <80 MM HG: CPT | Mod: CPTII,S$GLB,, | Performed by: NURSE PRACTITIONER

## 2023-03-30 PROCEDURE — 1159F PR MEDICATION LIST DOCUMENTED IN MEDICAL RECORD: ICD-10-PCS | Mod: CPTII,S$GLB,, | Performed by: NURSE PRACTITIONER

## 2023-03-30 PROCEDURE — 1160F PR REVIEW ALL MEDS BY PRESCRIBER/CLIN PHARMACIST DOCUMENTED: ICD-10-PCS | Mod: CPTII,S$GLB,, | Performed by: NURSE PRACTITIONER

## 2023-03-30 PROCEDURE — 1159F MED LIST DOCD IN RCRD: CPT | Mod: CPTII,S$GLB,, | Performed by: NURSE PRACTITIONER

## 2023-03-30 PROCEDURE — 99999 PR PBB SHADOW E&M-EST. PATIENT-LVL IV: CPT | Mod: PBBFAC,,, | Performed by: NURSE PRACTITIONER

## 2023-03-30 PROCEDURE — 87088 URINE BACTERIA CULTURE: CPT | Performed by: NURSE PRACTITIONER

## 2023-03-30 PROCEDURE — 87077 CULTURE AEROBIC IDENTIFY: CPT | Performed by: NURSE PRACTITIONER

## 2023-03-30 PROCEDURE — 3074F SYST BP LT 130 MM HG: CPT | Mod: CPTII,S$GLB,, | Performed by: NURSE PRACTITIONER

## 2023-03-30 PROCEDURE — 81002 PR URINALYSIS NONAUTO W/O SCOPE: ICD-10-PCS | Mod: S$GLB,,, | Performed by: NURSE PRACTITIONER

## 2023-03-30 PROCEDURE — 1160F RVW MEDS BY RX/DR IN RCRD: CPT | Mod: CPTII,S$GLB,, | Performed by: NURSE PRACTITIONER

## 2023-03-30 PROCEDURE — 3008F PR BODY MASS INDEX (BMI) DOCUMENTED: ICD-10-PCS | Mod: CPTII,S$GLB,, | Performed by: NURSE PRACTITIONER

## 2023-03-30 PROCEDURE — 3044F PR MOST RECENT HEMOGLOBIN A1C LEVEL <7.0%: ICD-10-PCS | Mod: CPTII,S$GLB,, | Performed by: NURSE PRACTITIONER

## 2023-03-30 PROCEDURE — 99204 PR OFFICE/OUTPT VISIT, NEW, LEVL IV, 45-59 MIN: ICD-10-PCS | Mod: S$GLB,,, | Performed by: NURSE PRACTITIONER

## 2023-03-30 PROCEDURE — 99999 PR PBB SHADOW E&M-EST. PATIENT-LVL IV: ICD-10-PCS | Mod: PBBFAC,,, | Performed by: NURSE PRACTITIONER

## 2023-03-30 RX ORDER — CEFDINIR 300 MG/1
300 CAPSULE ORAL 2 TIMES DAILY
Qty: 20 CAPSULE | Refills: 0 | Status: SHIPPED | OUTPATIENT
Start: 2023-03-30 | End: 2023-04-03

## 2023-03-30 RX ORDER — NALTREXONE HYDROCHLORIDE AND BUPROPION HYDROCHLORIDE 8; 90 MG/1; MG/1
TABLET, EXTENDED RELEASE ORAL
COMMUNITY
Start: 2023-02-06 | End: 2024-03-08

## 2023-03-30 NOTE — PROGRESS NOTES
Chief Complaint:   Urinary tract infection    HPI:   Patient 43-year-old female that is presenting with slight dysuria and left flank pain for over 3 weeks.  No history of renal stones.  Denies gross hematuria or pelvic pain.  Urine in clinic indicates nitrates and leukocytes, all other parameters are negative.  PVR was 35 mL.    Allergies:  Patient has no known allergies.    Medications:  has a current medication list which includes the following prescription(s): contrave and cefdinir.    Review of Systems:  General: No fever, chills, fatigability, or weight loss.  Skin: No rashes, itching, or changes in color or texture of skin.  Chest: Denies MCINTYRE, cyanosis, wheezing, cough, and sputum production.  Abdomen: Appetite fine. No weight loss. Denies diarrhea, abdominal pain, hematemesis, or blood in stool.  Musculoskeletal:  Flank pain  : As above.  All other review of systems negative.    PMH:   has no past medical history on file.    PSH:   has a past surgical history that includes Tubal ligation; Cholecystectomy; and Nissen fundoplication (2005).    FamHx: family history includes Hypertension in her mother; No Known Problems in her father.    SocHx:  reports that she quit smoking about 11 years ago. Her smoking use included cigarettes. She has never used smokeless tobacco. She reports that she does not drink alcohol and does not use drugs.      Physical Exam:  Vitals:    03/30/23 1332   BP: 110/76   Pulse: 101   Resp: 18   Temp: 97.5 °F (36.4 °C)     General:  Morbidly obese female, A&Ox3, no apparent distress, no deformities  Neck: No masses, normal thyroid  Lungs: normal inspiration, no use of accessory muscles  Heart: normal pulse, no arrhythmias  Abdomen: Soft, NT, ND, no masses, no hernias, no CVS tenderness   Labs/Studies:   See HPI    Impression/Plan:   Urinary tract infection  Urine was sent for culture and patient was empirically treated with Omnicef.  Patient will be contacted using patient portal  [employer] functionality with final urine culture results and needed follow-up.

## 2023-04-03 ENCOUNTER — PATIENT MESSAGE (OUTPATIENT)
Dept: UROLOGY | Facility: CLINIC | Age: 43
End: 2023-04-03
Payer: COMMERCIAL

## 2023-04-03 LAB — BACTERIA UR CULT: ABNORMAL

## 2023-04-03 RX ORDER — NITROFURANTOIN 25; 75 MG/1; MG/1
100 CAPSULE ORAL 2 TIMES DAILY
Qty: 20 CAPSULE | Refills: 0 | Status: SHIPPED | OUTPATIENT
Start: 2023-04-03

## 2023-04-19 ENCOUNTER — TELEPHONE (OUTPATIENT)
Dept: PAIN MEDICINE | Facility: CLINIC | Age: 43
End: 2023-04-19
Payer: COMMERCIAL

## 2023-04-24 ENCOUNTER — PATIENT MESSAGE (OUTPATIENT)
Dept: UROLOGY | Facility: CLINIC | Age: 43
End: 2023-04-24
Payer: COMMERCIAL

## 2023-04-25 ENCOUNTER — OFFICE VISIT (OUTPATIENT)
Dept: UROLOGY | Facility: CLINIC | Age: 43
End: 2023-04-25
Payer: COMMERCIAL

## 2023-04-25 VITALS
WEIGHT: 170 LBS | HEART RATE: 68 BPM | DIASTOLIC BLOOD PRESSURE: 72 MMHG | BODY MASS INDEX: 30.11 KG/M2 | SYSTOLIC BLOOD PRESSURE: 100 MMHG

## 2023-04-25 DIAGNOSIS — N30.00 ACUTE CYSTITIS WITHOUT HEMATURIA: Primary | ICD-10-CM

## 2023-04-25 LAB
BILIRUB SERPL-MCNC: NORMAL MG/DL
BLOOD URINE, POC: NORMAL
CLARITY, POC UA: CLEAR
COLOR, POC UA: YELLOW
GLUCOSE UR QL STRIP: NORMAL
KETONES UR QL STRIP: NORMAL
LEUKOCYTE ESTERASE URINE, POC: NORMAL
NITRITE, POC UA: NORMAL
PH, POC UA: 7
POC RESIDUAL URINE VOLUME: 6 ML (ref 0–100)
POC RESIDUAL URINE VOLUME: NORMAL (ref 0–100)
PROTEIN, POC: NORMAL
SPECIFIC GRAVITY, POC UA: 1.01
UROBILINOGEN, POC UA: NORMAL

## 2023-04-25 PROCEDURE — 1159F MED LIST DOCD IN RCRD: CPT | Mod: CPTII,S$GLB,, | Performed by: NURSE PRACTITIONER

## 2023-04-25 PROCEDURE — 3074F PR MOST RECENT SYSTOLIC BLOOD PRESSURE < 130 MM HG: ICD-10-PCS | Mod: CPTII,S$GLB,, | Performed by: NURSE PRACTITIONER

## 2023-04-25 PROCEDURE — 99999 PR PBB SHADOW E&M-EST. PATIENT-LVL III: CPT | Mod: PBBFAC,,, | Performed by: NURSE PRACTITIONER

## 2023-04-25 PROCEDURE — 3044F PR MOST RECENT HEMOGLOBIN A1C LEVEL <7.0%: ICD-10-PCS | Mod: CPTII,S$GLB,, | Performed by: NURSE PRACTITIONER

## 2023-04-25 PROCEDURE — 87086 URINE CULTURE/COLONY COUNT: CPT | Performed by: NURSE PRACTITIONER

## 2023-04-25 PROCEDURE — 87077 CULTURE AEROBIC IDENTIFY: CPT | Performed by: NURSE PRACTITIONER

## 2023-04-25 PROCEDURE — 99214 PR OFFICE/OUTPT VISIT, EST, LEVL IV, 30-39 MIN: ICD-10-PCS | Mod: S$GLB,,, | Performed by: NURSE PRACTITIONER

## 2023-04-25 PROCEDURE — 3008F BODY MASS INDEX DOCD: CPT | Mod: CPTII,S$GLB,, | Performed by: NURSE PRACTITIONER

## 2023-04-25 PROCEDURE — 81002 POCT URINE DIPSTICK WITHOUT MICROSCOPE: ICD-10-PCS | Mod: S$GLB,,, | Performed by: NURSE PRACTITIONER

## 2023-04-25 PROCEDURE — 1159F PR MEDICATION LIST DOCUMENTED IN MEDICAL RECORD: ICD-10-PCS | Mod: CPTII,S$GLB,, | Performed by: NURSE PRACTITIONER

## 2023-04-25 PROCEDURE — 51798 POCT BLADDER SCAN: ICD-10-PCS | Mod: S$GLB,,, | Performed by: NURSE PRACTITIONER

## 2023-04-25 PROCEDURE — 99214 OFFICE O/P EST MOD 30 MIN: CPT | Mod: S$GLB,,, | Performed by: NURSE PRACTITIONER

## 2023-04-25 PROCEDURE — 3074F SYST BP LT 130 MM HG: CPT | Mod: CPTII,S$GLB,, | Performed by: NURSE PRACTITIONER

## 2023-04-25 PROCEDURE — 3044F HG A1C LEVEL LT 7.0%: CPT | Mod: CPTII,S$GLB,, | Performed by: NURSE PRACTITIONER

## 2023-04-25 PROCEDURE — 99999 PR PBB SHADOW E&M-EST. PATIENT-LVL III: ICD-10-PCS | Mod: PBBFAC,,, | Performed by: NURSE PRACTITIONER

## 2023-04-25 PROCEDURE — 87088 URINE BACTERIA CULTURE: CPT | Performed by: NURSE PRACTITIONER

## 2023-04-25 PROCEDURE — 3008F PR BODY MASS INDEX (BMI) DOCUMENTED: ICD-10-PCS | Mod: CPTII,S$GLB,, | Performed by: NURSE PRACTITIONER

## 2023-04-25 PROCEDURE — 81002 URINALYSIS NONAUTO W/O SCOPE: CPT | Mod: S$GLB,,, | Performed by: NURSE PRACTITIONER

## 2023-04-25 PROCEDURE — 3078F PR MOST RECENT DIASTOLIC BLOOD PRESSURE < 80 MM HG: ICD-10-PCS | Mod: CPTII,S$GLB,, | Performed by: NURSE PRACTITIONER

## 2023-04-25 PROCEDURE — 87186 SC STD MICRODIL/AGAR DIL: CPT | Performed by: NURSE PRACTITIONER

## 2023-04-25 PROCEDURE — 51798 US URINE CAPACITY MEASURE: CPT | Mod: S$GLB,,, | Performed by: NURSE PRACTITIONER

## 2023-04-25 PROCEDURE — 3078F DIAST BP <80 MM HG: CPT | Mod: CPTII,S$GLB,, | Performed by: NURSE PRACTITIONER

## 2023-04-25 NOTE — PROGRESS NOTES
Chief Complaint:   E-coli UTI    HPI:   Patient is presenting as a follow-up to E coli cystitis.  Patient completed antibiotics and states that she is completely asymptomatic.  Urine in clinic indicates trace leukocytes, all other parameters are negative.  03/30/2023  Patient 43-year-old female that is presenting with slight dysuria and left flank pain for over 3 weeks.  No history of renal stones.  Denies gross hematuria or pelvic pain.  Urine in clinic indicates nitrates and leukocytes, all other parameters are negative.  PVR was 35 mL.         Allergies:  Patient has no known allergies.    Medications:  has a current medication list which includes the following prescription(s): contrave and nitrofurantoin (macrocrystal-monohydrate).    Review of Systems:  General: No fever, chills, fatigability, or weight loss.  Skin: No rashes, itching, or changes in color or texture of skin.  Chest: Denies MCINTYRE, cyanosis, wheezing, cough, and sputum production.  Abdomen: Appetite fine. No weight loss. Denies diarrhea, abdominal pain, hematemesis, or blood in stool.  Musculoskeletal: No joint stiffness or swelling. Denies back pain.  : As above.  All other review of systems negative.    PMH:   has no past medical history on file.    PSH:   has a past surgical history that includes Tubal ligation; Cholecystectomy; and Nissen fundoplication (2005).    FamHx: family history includes Hypertension in her mother; No Known Problems in her father.    SocHx:  reports that she quit smoking about 11 years ago. Her smoking use included cigarettes. She has never used smokeless tobacco. She reports that she does not drink alcohol and does not use drugs.      Physical Exam:  Vitals:    04/25/23 1330   BP: 100/72   Pulse: 68     General:  Morbidly obese female, A&Ox3, no apparent distress, no deformities  Neck: No masses, normal thyroid  Lungs: normal inspiration, no use of accessory muscles  Heart: normal pulse, no arrhythmias  Abdomen: Soft,  NT, ND, no masses, no hernias, no hepatosplenomegaly  Lymphatic: Neck and groin nodes negative  Labs/Studies:   See HPI    Impression/Plan:   E coli cystitis  Patient was re-educated on behavior modifications needed, in sexually active females, to decrease the risk of E coli cystitis.  Urine will be sent for culture.  If negative patient can follow-up with us p.r.n..

## 2023-04-27 ENCOUNTER — PATIENT MESSAGE (OUTPATIENT)
Dept: UROLOGY | Facility: CLINIC | Age: 43
End: 2023-04-27
Payer: COMMERCIAL

## 2023-04-27 DIAGNOSIS — Z87.440 HISTORY OF RECURRENT UTIS: Primary | ICD-10-CM

## 2023-04-27 RX ORDER — CEFDINIR 300 MG/1
300 CAPSULE ORAL 2 TIMES DAILY
Qty: 20 CAPSULE | Refills: 0 | Status: SHIPPED | OUTPATIENT
Start: 2023-04-27 | End: 2023-05-01

## 2023-04-28 ENCOUNTER — HOSPITAL ENCOUNTER (OUTPATIENT)
Dept: RADIOLOGY | Facility: HOSPITAL | Age: 43
Discharge: HOME | End: 2023-04-28
Attending: NURSE PRACTITIONER
Payer: COMMERCIAL

## 2023-04-28 ENCOUNTER — PATIENT MESSAGE (OUTPATIENT)
Dept: UROLOGY | Facility: CLINIC | Age: 43
End: 2023-04-28
Payer: COMMERCIAL

## 2023-04-28 DIAGNOSIS — Z87.440 HISTORY OF RECURRENT UTIS: ICD-10-CM

## 2023-04-28 LAB — BACTERIA UR CULT: ABNORMAL

## 2023-04-28 PROCEDURE — 76770 US EXAM ABDO BACK WALL COMP: CPT | Mod: TC

## 2023-04-28 PROCEDURE — 76770 US RETROPERITONEAL COMPLETE: ICD-10-PCS | Mod: 26,,, | Performed by: RADIOLOGY

## 2023-04-28 PROCEDURE — 76770 US EXAM ABDO BACK WALL COMP: CPT | Mod: 26,,, | Performed by: RADIOLOGY

## 2023-05-01 ENCOUNTER — PATIENT MESSAGE (OUTPATIENT)
Dept: UROLOGY | Facility: CLINIC | Age: 43
End: 2023-05-01
Payer: COMMERCIAL

## 2023-05-01 RX ORDER — AMOXICILLIN AND CLAVULANATE POTASSIUM 875; 125 MG/1; MG/1
1 TABLET, FILM COATED ORAL 2 TIMES DAILY
Qty: 20 TABLET | Refills: 0 | Status: SHIPPED | OUTPATIENT
Start: 2023-05-01 | End: 2023-05-11

## 2023-06-13 ENCOUNTER — HOSPITAL ENCOUNTER (OUTPATIENT)
Dept: RADIOLOGY | Facility: HOSPITAL | Age: 43
Discharge: HOME | End: 2023-06-13
Attending: NURSE PRACTITIONER
Payer: COMMERCIAL

## 2023-06-13 DIAGNOSIS — Z12.31 ENCOUNTER FOR SCREENING MAMMOGRAM FOR MALIGNANT NEOPLASM OF BREAST: ICD-10-CM

## 2023-06-13 PROCEDURE — 77067 SCR MAMMO BI INCL CAD: CPT | Mod: TC

## 2023-06-13 PROCEDURE — 77067 MAMMO DIGITAL SCREENING BILAT WITH TOMO: ICD-10-PCS | Mod: 26,,, | Performed by: RADIOLOGY

## 2023-06-13 PROCEDURE — 77063 BREAST TOMOSYNTHESIS BI: CPT | Mod: 26,,, | Performed by: RADIOLOGY

## 2023-06-13 PROCEDURE — 77067 SCR MAMMO BI INCL CAD: CPT | Mod: 26,,, | Performed by: RADIOLOGY

## 2023-06-13 PROCEDURE — 77063 MAMMO DIGITAL SCREENING BILAT WITH TOMO: ICD-10-PCS | Mod: 26,,, | Performed by: RADIOLOGY

## 2023-10-09 ENCOUNTER — OFFICE VISIT (OUTPATIENT)
Dept: SPORTS MEDICINE | Facility: CLINIC | Age: 43
End: 2023-10-09
Payer: COMMERCIAL

## 2023-10-09 ENCOUNTER — HOSPITAL ENCOUNTER (OUTPATIENT)
Dept: RADIOLOGY | Facility: HOSPITAL | Age: 43
Discharge: HOME | End: 2023-10-09
Attending: STUDENT IN AN ORGANIZED HEALTH CARE EDUCATION/TRAINING PROGRAM
Payer: COMMERCIAL

## 2023-10-09 VITALS — WEIGHT: 170 LBS | HEIGHT: 63 IN | BODY MASS INDEX: 30.12 KG/M2

## 2023-10-09 DIAGNOSIS — M77.11 LATERAL EPICONDYLITIS OF RIGHT ELBOW: Primary | ICD-10-CM

## 2023-10-09 DIAGNOSIS — M25.521 PAIN IN RIGHT ELBOW: Primary | ICD-10-CM

## 2023-10-09 DIAGNOSIS — M25.521 PAIN IN RIGHT ELBOW: ICD-10-CM

## 2023-10-09 PROCEDURE — 3044F PR MOST RECENT HEMOGLOBIN A1C LEVEL <7.0%: ICD-10-PCS | Mod: CPTII,S$GLB,, | Performed by: STUDENT IN AN ORGANIZED HEALTH CARE EDUCATION/TRAINING PROGRAM

## 2023-10-09 PROCEDURE — 3008F BODY MASS INDEX DOCD: CPT | Mod: CPTII,S$GLB,, | Performed by: STUDENT IN AN ORGANIZED HEALTH CARE EDUCATION/TRAINING PROGRAM

## 2023-10-09 PROCEDURE — 3008F PR BODY MASS INDEX (BMI) DOCUMENTED: ICD-10-PCS | Mod: CPTII,S$GLB,, | Performed by: STUDENT IN AN ORGANIZED HEALTH CARE EDUCATION/TRAINING PROGRAM

## 2023-10-09 PROCEDURE — 97110 PR THERAPEUTIC EXERCISES: ICD-10-PCS | Mod: 59,GP,S$GLB, | Performed by: STUDENT IN AN ORGANIZED HEALTH CARE EDUCATION/TRAINING PROGRAM

## 2023-10-09 PROCEDURE — 97110 THERAPEUTIC EXERCISES: CPT | Mod: 59,GP,S$GLB, | Performed by: STUDENT IN AN ORGANIZED HEALTH CARE EDUCATION/TRAINING PROGRAM

## 2023-10-09 PROCEDURE — 73080 XR ELBOW COMPLETE 3 VIEW RIGHT: ICD-10-PCS | Mod: 26,RT,, | Performed by: RADIOLOGY

## 2023-10-09 PROCEDURE — 97760 PR ORTHOTIC MGMT&TRAINJ INITIAL ENC EA 15 MINS: ICD-10-PCS | Mod: GP,S$GLB,, | Performed by: STUDENT IN AN ORGANIZED HEALTH CARE EDUCATION/TRAINING PROGRAM

## 2023-10-09 PROCEDURE — 3044F HG A1C LEVEL LT 7.0%: CPT | Mod: CPTII,S$GLB,, | Performed by: STUDENT IN AN ORGANIZED HEALTH CARE EDUCATION/TRAINING PROGRAM

## 2023-10-09 PROCEDURE — 99204 PR OFFICE/OUTPT VISIT, NEW, LEVL IV, 45-59 MIN: ICD-10-PCS | Mod: 25,S$GLB,, | Performed by: STUDENT IN AN ORGANIZED HEALTH CARE EDUCATION/TRAINING PROGRAM

## 2023-10-09 PROCEDURE — 97760 ORTHOTIC MGMT&TRAING 1ST ENC: CPT | Mod: GP,S$GLB,, | Performed by: STUDENT IN AN ORGANIZED HEALTH CARE EDUCATION/TRAINING PROGRAM

## 2023-10-09 PROCEDURE — 99204 OFFICE O/P NEW MOD 45 MIN: CPT | Mod: 25,S$GLB,, | Performed by: STUDENT IN AN ORGANIZED HEALTH CARE EDUCATION/TRAINING PROGRAM

## 2023-10-09 PROCEDURE — 1159F PR MEDICATION LIST DOCUMENTED IN MEDICAL RECORD: ICD-10-PCS | Mod: CPTII,S$GLB,, | Performed by: STUDENT IN AN ORGANIZED HEALTH CARE EDUCATION/TRAINING PROGRAM

## 2023-10-09 PROCEDURE — 73080 X-RAY EXAM OF ELBOW: CPT | Mod: 26,RT,, | Performed by: RADIOLOGY

## 2023-10-09 PROCEDURE — 99999 PR PBB SHADOW E&M-EST. PATIENT-LVL III: CPT | Mod: PBBFAC,,, | Performed by: STUDENT IN AN ORGANIZED HEALTH CARE EDUCATION/TRAINING PROGRAM

## 2023-10-09 PROCEDURE — 73080 X-RAY EXAM OF ELBOW: CPT | Mod: TC,RT

## 2023-10-09 PROCEDURE — 99999 PR PBB SHADOW E&M-EST. PATIENT-LVL III: ICD-10-PCS | Mod: PBBFAC,,, | Performed by: STUDENT IN AN ORGANIZED HEALTH CARE EDUCATION/TRAINING PROGRAM

## 2023-10-09 PROCEDURE — 1159F MED LIST DOCD IN RCRD: CPT | Mod: CPTII,S$GLB,, | Performed by: STUDENT IN AN ORGANIZED HEALTH CARE EDUCATION/TRAINING PROGRAM

## 2023-10-09 RX ORDER — MELOXICAM 15 MG/1
15 TABLET ORAL DAILY
Qty: 30 TABLET | Refills: 1 | Status: SHIPPED | OUTPATIENT
Start: 2023-10-09 | End: 2023-11-20

## 2023-10-09 NOTE — PROGRESS NOTES
Patient ID: Anahy Mtaa  YOB: 1980  MRN: [id number]    Chief Complaint: Pain of the Right Elbow    Referred By:   Self    History of Present Illness: Anahy Mata is a right-hand dominant 43 y.o. female who presents today with  right elbow pain  for 4 weeks.      43-year-old female presenting today for right elbow pain.  Four weeks of pain denies any inciting injury or trauma.  Notes worsening at night as well as with increased activity or holding or gripping objects.  She notes picking up her Bony cup to drink is very bothersome but notes less pain when having her forearm fully extended.  Notes the pain is mostly over lateral elbow denies any previous surgeries traumas.  Notes some subjective numbness and tingling at times.  Has been taking ibuprofen but has not tried anything else to try make it better.    Past Medical History:   No past medical history on file.  Past Surgical History:   Procedure Laterality Date    CHOLECYSTECTOMY      NISSEN FUNDOPLICATION  2005    TUBAL LIGATION       Family History   Problem Relation Age of Onset    Hypertension Mother     No Known Problems Father      Social History     Socioeconomic History    Marital status:    Tobacco Use    Smoking status: Former     Current packs/day: 0.00     Types: Cigarettes     Quit date: 2011     Years since quittin.2    Smokeless tobacco: Never   Substance and Sexual Activity    Alcohol use: No    Drug use: No    Sexual activity: Yes     Partners: Male     Medication List with Changes/Refills   New Medications    MELOXICAM (MOBIC) 15 MG TABLET    Take 1 tablet (15 mg total) by mouth once daily.   Current Medications    NALTREXONE-BUPROPION (CONTRAVE) 8-90 MG TBSR    Take 1 tablet with breakfast for 1 week, then 1 with breakfast and 1 with dinner for 1 week, then 2 with breakfast and 1 with dinner for 1 week, then 2 twice daily thereafter.    NITROFURANTOIN, MACROCRYSTAL-MONOHYDRATE, (MACROBID)  100 MG CAPSULE    Take 1 capsule (100 mg total) by mouth 2 (two) times daily.     Review of patient's allergies indicates:  No Known Allergies    Physical Exam:   Body mass index is 30.11 kg/m².    GENERAL: Well appearing, in no acute distress.  HEAD: Normocephalic and atraumatic.  ENT: External ears and nose grossly normal.  EYES: EOMI bilaterally  PULMONARY: Respirations are grossly even and non-labored.  NEURO: Awake, alert, and oriented x 3.  SKIN: No obvious rashes appreciated.  PSYCH: Mood & affect are appropriate.    Detailed MSK exam:       Right elbow exam full flexion-extension pronation supination tenderness over lateral epicondyle extending down in the myotendinous junction distally of the extensor tendons.  Motor function of median ulnar radial nerve all intact 2+ radial pulse.  Pain with resisted wrist extension 3rd digit extension no pain with resisted supination or maximal flexion of the wrist.    Imaging:  X-Ray Elbow Complete Right  Narrative: EXAMINATION:  XR ELBOW COMPLETE 3 VIEW RIGHT    CLINICAL HISTORY:  . Pain in right elbow    TECHNIQUE:  AP, lateral, and oblique views of the right elbow were performed.    COMPARISON:  None    FINDINGS:  No acute abnormality or significant arthritic change.  Impression: As above    Electronically signed by: Nathaniel Sommer MD  Date:    10/09/2023  Time:    10:41      Relevant imaging results were reviewed and interpreted by me and per my read   As abov.  This was discussed with the patient and / or family today.     Assessment:  Anahy Mata is a 43 y.o. female  presents today for 4 weeks of right lateral elbow pain most consistent with acute tendinitis of the common extensor tendon.  We discussed the diagnosis prognosis as well as conservative treatment options moving forward.  Discussed home exercises as well as wrist brace at night and counterforce brace for activity she will wear it a little bit lower down in distal as she is having some  myotendinous pain associated as well.  Discussed potential need for formal therapy and possible advanced imaging with point of care ultrasound and CSI if not improving in 4-6 weeks.  Oral meloxicam and topical Voltaren as needed for pain relief at this time as well.  Follow-up in 4-6 weeks.    Lateral epicondylitis of right elbow  -     meloxicam (MOBIC) 15 MG tablet; Take 1 tablet (15 mg total) by mouth once daily.  Dispense: 30 tablet; Refill: 1         A copy of today's visit note has been sent to the referring provider.       Anupam Smith MD    Disclaimer: This note was prepared using a voice recognition system and is likely to have sound alike errors within the text.

## 2023-10-09 NOTE — PATIENT INSTRUCTIONS
Assessment:  Anahy Mata is a 43 y.o. female   Chief Complaint   Patient presents with    Right Elbow - Pain       Encounter Diagnosis   Name Primary?    Lateral epicondylitis of right elbow Yes        Plan:  Apply topical diclofenac (Voltaren) up to 4 times a day to the affected area.  It can be bought over the counter at any local pharmacy.    You were prescribed meloxicam today as an anti-inflammatory medicine for the pain you are having.  Please take this medicine once a day with food for 2 weeks, and then as needed for days with significant recurrent pain.  Please do not take any other anti-inflammatories such as naproxen, ibuprofen, Aleve at the same time you are taking this medicine.  Counterforce brace on [employer] for daily activity.  Wrist brace at night.   At least 8 minutes were spent teaching the patient a therapeutic home exercise program and they were provided this plan in writing.  CPT 86400  At least 8 minutes were spent sizing, fitting, and educating for durable medical equipment application today.  CPT 53564.                   If you have any difficulties reading this information, you may visit the online version using the following link: Therapeutic Exercise Program for Epicondylitis (https://orthoinfo.aaos.org/globalassets/pdfs/2022-therapeutic-exercise-program-for-epicondylitis.pdf)       Follow-up: 4-6 weeks or sooner if there are any problems between now and then.    Thank you for choosing [facility] and Dr. Anupam Smith for your orthopedic & sports medicine care. It is our goal to provide you with exceptional care that will help keep you healthy, active, and get you back in the game.    Please do not hesitate to reach out to us via email, phone, or [email or web address] with any questions, concerns, or feedback.    If you felt that you received exemplary care today, please consider leaving us feedback on [employer]  at:  [email or web address]    If you are experiencing pain/discomfort ,or have questions after 5pm and would like to be connected to the [facility] on-call team, please call this number and specify which Sports Medicine provider is treating you: [phone]

## 2023-11-15 ENCOUNTER — OFFICE VISIT (OUTPATIENT)
Dept: SPORTS MEDICINE | Facility: CLINIC | Age: 43
End: 2023-11-15
Payer: COMMERCIAL

## 2023-11-15 VITALS — WEIGHT: 170 LBS | BODY MASS INDEX: 30.12 KG/M2 | HEIGHT: 63 IN

## 2023-11-15 DIAGNOSIS — M77.11 LATERAL EPICONDYLITIS OF RIGHT ELBOW: Primary | ICD-10-CM

## 2023-11-15 PROCEDURE — 99214 PR OFFICE/OUTPT VISIT, EST, LEVL IV, 30-39 MIN: ICD-10-PCS | Mod: 25,S$GLB,, | Performed by: STUDENT IN AN ORGANIZED HEALTH CARE EDUCATION/TRAINING PROGRAM

## 2023-11-15 PROCEDURE — 1159F PR MEDICATION LIST DOCUMENTED IN MEDICAL RECORD: ICD-10-PCS | Mod: CPTII,S$GLB,, | Performed by: STUDENT IN AN ORGANIZED HEALTH CARE EDUCATION/TRAINING PROGRAM

## 2023-11-15 PROCEDURE — 3044F PR MOST RECENT HEMOGLOBIN A1C LEVEL <7.0%: ICD-10-PCS | Mod: CPTII,S$GLB,, | Performed by: STUDENT IN AN ORGANIZED HEALTH CARE EDUCATION/TRAINING PROGRAM

## 2023-11-15 PROCEDURE — 20606 INTERMEDIATE JOINT ASPIRATION/INJECTION: ICD-10-PCS | Mod: RT,S$GLB,, | Performed by: STUDENT IN AN ORGANIZED HEALTH CARE EDUCATION/TRAINING PROGRAM

## 2023-11-15 PROCEDURE — 99999 PR PBB SHADOW E&M-EST. PATIENT-LVL III: ICD-10-PCS | Mod: PBBFAC,,, | Performed by: STUDENT IN AN ORGANIZED HEALTH CARE EDUCATION/TRAINING PROGRAM

## 2023-11-15 PROCEDURE — 99214 OFFICE O/P EST MOD 30 MIN: CPT | Mod: 25,S$GLB,, | Performed by: STUDENT IN AN ORGANIZED HEALTH CARE EDUCATION/TRAINING PROGRAM

## 2023-11-15 PROCEDURE — 99999 PR PBB SHADOW E&M-EST. PATIENT-LVL III: CPT | Mod: PBBFAC,,, | Performed by: STUDENT IN AN ORGANIZED HEALTH CARE EDUCATION/TRAINING PROGRAM

## 2023-11-15 PROCEDURE — 3044F HG A1C LEVEL LT 7.0%: CPT | Mod: CPTII,S$GLB,, | Performed by: STUDENT IN AN ORGANIZED HEALTH CARE EDUCATION/TRAINING PROGRAM

## 2023-11-15 PROCEDURE — 20606 DRAIN/INJ JOINT/BURSA W/US: CPT | Mod: RT,S$GLB,, | Performed by: STUDENT IN AN ORGANIZED HEALTH CARE EDUCATION/TRAINING PROGRAM

## 2023-11-15 PROCEDURE — 1159F MED LIST DOCD IN RCRD: CPT | Mod: CPTII,S$GLB,, | Performed by: STUDENT IN AN ORGANIZED HEALTH CARE EDUCATION/TRAINING PROGRAM

## 2023-11-15 RX ORDER — BETAMETHASONE SODIUM PHOSPHATE AND BETAMETHASONE ACETATE 3; 3 MG/ML; MG/ML
3 INJECTION, SUSPENSION INTRA-ARTICULAR; INTRALESIONAL; INTRAMUSCULAR; SOFT TISSUE
Status: DISCONTINUED | OUTPATIENT
Start: 2023-11-15 | End: 2023-11-15 | Stop reason: HOSPADM

## 2023-11-15 RX ADMIN — BETAMETHASONE SODIUM PHOSPHATE AND BETAMETHASONE ACETATE 3 MG: 3; 3 INJECTION, SUSPENSION INTRA-ARTICULAR; INTRALESIONAL; INTRAMUSCULAR; SOFT TISSUE at 11:11

## 2023-11-15 NOTE — PROCEDURES
R lateral epicondyleIntermediate Joint Aspiration/Injection    Date/Time: 11/15/2023 11:00 AM    Performed by: Anupam Smtih MD  Authorized by: Anupam Smith MD    Consent Done?:  Yes (Verbal)  Indications:  Pain  Site marked: The procedure site was marked    Timeout: Prior to procedure the correct patient, procedure, and site was verified      Location:  Elbow  Prep: Patient was prepped and draped in usual sterile fashion    Needle size:  25 G  Approach:  Dorsal  Medications:  3 mg betamethasone acetate-betamethasone sodium phosphate 6 mg/mL  Patient tolerance:  Patient tolerated the procedure well with no immediate complications       Additional Comments: Ultrasound guidance was used for needle localization. Images were saved and stored for documentation. The appropriate structures were visualized. Dynamic visualization of the needle was continuous throughout the procedures and maintained good position.     We discussed the proper protocols after the injection such as no submerging pools, baths tubs, or hot tubs for 24 hr.  Showering is okay today.  We also discussed that blood sugars can be elevated after an injection and asked patient to properly checked her sugars over the next few days and contact their PCP if there are any concerns.  We discussed red flags such as fevers, chills, red, warm, tender joint at the area of injection to please seek medical care immediately.

## 2023-11-15 NOTE — PATIENT INSTRUCTIONS
Assessment:  Anahy Mata is a 43 y.o. female   Chief Complaint   Patient presents with    Right Elbow - Pain       Encounter Diagnosis   Name Primary?    Lateral epicondylitis of right elbow Yes        Plan:  I recommend proceeding with intra-articular corticosteroid injection into the right lateral elbow. The patient is in agreement with this plan.   Procedure performed today and patient tolerated the procedure well with no immediate complications.       Follow-up: as needed or sooner if there are any problems between now and then.    Thank you for choosing [facility] and Dr. Anupam Smith for your orthopedic & sports medicine care. It is our goal to provide you with exceptional care that will help keep you healthy, active, and get you back in the game.    Please do not hesitate to reach out to us via email, phone, or MyChart with any questions, concerns, or feedback.    If you felt that you received exemplary care today, please consider leaving us feedback on [employer] at:  [email or web address]    If you are experiencing pain/discomfort ,or have questions after 5pm and would like to be connected to the [facility] on-call team, please call this number and specify which Sports Medicine provider is treating you: [phone]

## 2023-11-15 NOTE — PROGRESS NOTES
Patient ID: Anahy Mata  YOB: 1980  MRN: [id number]    Chief Complaint: Pain of the Right Elbow    Referred By:   Self    History of Present Illness: Anahy Mata is a right-hand dominant 43 y.o. female who presents today with  right elbow pain  for 9 weeks.   Patient presents today for follow-up of right elbow lateral epicondylitis. She reports she has been compliant in wearing the wrist and counterforce braces. She has been taking meloxicam as needed as well with little relief. She reports today for further treatment options.         To review her hx from 10/9/23   43-year-old female presenting today for right elbow pain.  Four weeks of pain denies any inciting injury or trauma.  Notes worsening at night as well as with increased activity or holding or gripping objects.  She notes picking up her Bony cup to drink is very bothersome but notes less pain when having her forearm fully extended.  Notes the pain is mostly over lateral elbow denies any previous surgeries traumas.  Notes some subjective numbness and tingling at times.  Has been taking ibuprofen but has not tried anything else to try make it better.    Past Medical History:   History reviewed. No pertinent past medical history.  Past Surgical History:   Procedure Laterality Date    CHOLECYSTECTOMY      NISSEN FUNDOPLICATION  2005    TUBAL LIGATION       Family History   Problem Relation Age of Onset    Hypertension Mother     No Known Problems Father      Social History     Socioeconomic History    Marital status:    Tobacco Use    Smoking status: Former     Current packs/day: 0.00     Types: Cigarettes     Quit date: 2011     Years since quittin.3    Smokeless tobacco: Never   Substance and Sexual Activity    Alcohol use: No    Drug use: No    Sexual activity: Yes     Partners: Male     Medication List with Changes/Refills   Current Medications    MELOXICAM (MOBIC) 15 MG TABLET    Take 1 tablet (15 mg  total) by mouth once daily.    NALTREXONE-BUPROPION (CONTRAVE) 8-90 MG TBSR    Take 1 tablet with breakfast for 1 week, then 1 with breakfast and 1 with dinner for 1 week, then 2 with breakfast and 1 with dinner for 1 week, then 2 twice daily thereafter.    NITROFURANTOIN, MACROCRYSTAL-MONOHYDRATE, (MACROBID) 100 MG CAPSULE    Take 1 capsule (100 mg total) by mouth 2 (two) times daily.     Review of patient's allergies indicates:  No Known Allergies    Physical Exam:   Body mass index is 30.11 kg/m².    GENERAL: Well appearing, in no acute distress.  HEAD: Normocephalic and atraumatic.  ENT: External ears and nose grossly normal.  EYES: EOMI bilaterally  PULMONARY: Respirations are grossly even and non-labored.  NEURO: Awake, alert, and oriented x 3.  SKIN: No obvious rashes appreciated.  PSYCH: Mood & affect are appropriate.    Detailed MSK exam:       Right elbow exam full flexion-extension pronation supination tenderness over lateral epicondyle extending down in the myotendinous junction distally of the extensor tendons.  Motor function of median ulnar radial nerve all intact 2+ radial pulse.  Pain with resisted wrist extension 3rd digit extension no pain with resisted supination or maximal flexion of the wrist.    Imaging:  X-Ray Elbow Complete Right  Narrative: EXAMINATION:  XR ELBOW COMPLETE 3 VIEW RIGHT    CLINICAL HISTORY:  . Pain in right elbow    TECHNIQUE:  AP, lateral, and oblique views of the right elbow were performed.    COMPARISON:  None    FINDINGS:  No acute abnormality or significant arthritic change.  Impression: As above    Electronically signed by: Nathaniel Sommer MD  Date:    10/09/2023  Time:    10:41      Relevant imaging results were reviewed and interpreted by me and per my read   As abov.  This was discussed with the patient and / or family today.     Assessment:  Anahy Mata is a 43 y.o. female  presents today for follow-up for right lateral elbow pain, some improvement with  oral anti-inflammatories and conservative treatment but consistently having pain over the muscle belly in common extensor tendon.  Point of care ultrasound showed focal tendinosis in the ECRB with some mild thickening.  Discussed moving forward with a corticosteroid injection under ultrasound guidance.  Please refer to procedure note for the details.  Consider minimally invasive tendon debridement versus PRP in the future due to the very localized area of tendinosis.    Lateral epicondylitis of right elbow  -     ULS US Guidance for Needle Placement         A copy of today's visit note has been sent to the referring provider.       Anupam Smith MD    Disclaimer: This note was prepared using a voice recognition system and is likely to have sound alike errors within the text.     I, Jordin Jimenez, acted as a scribe for Anupam Smith MD for the duration of this office visit.

## 2023-11-20 DIAGNOSIS — M77.11 LATERAL EPICONDYLITIS OF RIGHT ELBOW: ICD-10-CM

## 2023-11-20 RX ORDER — MELOXICAM 15 MG/1
15 TABLET ORAL DAILY
Qty: 30 TABLET | Refills: 1 | Status: SHIPPED | OUTPATIENT
Start: 2023-11-20 | End: 2024-02-06 | Stop reason: SDUPTHER

## 2024-01-17 ENCOUNTER — OFFICE VISIT (OUTPATIENT)
Dept: SPORTS MEDICINE | Facility: CLINIC | Age: 44
End: 2024-01-17
Payer: COMMERCIAL

## 2024-01-17 VITALS — WEIGHT: 132 LBS | HEIGHT: 63 IN | BODY MASS INDEX: 23.39 KG/M2

## 2024-01-17 DIAGNOSIS — M77.11 LATERAL EPICONDYLITIS OF RIGHT ELBOW: Primary | ICD-10-CM

## 2024-01-17 DIAGNOSIS — G56.21 ULNAR NEUROPATHY AT ELBOW OF RIGHT UPPER EXTREMITY: ICD-10-CM

## 2024-01-17 PROCEDURE — 99999 PR PBB SHADOW E&M-EST. PATIENT-LVL III: CPT | Mod: PBBFAC,,, | Performed by: STUDENT IN AN ORGANIZED HEALTH CARE EDUCATION/TRAINING PROGRAM

## 2024-01-17 PROCEDURE — 1159F MED LIST DOCD IN RCRD: CPT | Mod: CPTII,S$GLB,, | Performed by: STUDENT IN AN ORGANIZED HEALTH CARE EDUCATION/TRAINING PROGRAM

## 2024-01-17 PROCEDURE — 99215 OFFICE O/P EST HI 40 MIN: CPT | Mod: S$GLB,,, | Performed by: STUDENT IN AN ORGANIZED HEALTH CARE EDUCATION/TRAINING PROGRAM

## 2024-01-17 PROCEDURE — 3008F BODY MASS INDEX DOCD: CPT | Mod: CPTII,S$GLB,, | Performed by: STUDENT IN AN ORGANIZED HEALTH CARE EDUCATION/TRAINING PROGRAM

## 2024-01-17 NOTE — PROGRESS NOTES
Patient ID: Anahy Mata  YOB: 1980  MRN: [id number]    Chief Complaint: Pain of the Right Upper Arm      History of Present Illness: Anahy Mata is a right-hand dominant 43 y.o. female who is here for f/u evaluation of right lateral epicondylitis.     The patient is active in none.  Occupation: HR  Last Appointment: 11/15/23  Diagnosis: Lateral epicondylitis of right elbow  Prior Procedure: US guided injection   PT Location: n/a    The patient returns today reporting that the symptoms have worsened and is interested in proceeding with further options. She reports the last injection provided relief for 4-5 weeks but has since worn off and now her pain has worsened. She also reports new numbness and tingling, feeling of weakness along the ulnar nerve path that has been present for 2 weeks. She has not done PT yet and is still taking meloxicam for pain as needed.     To review her history, 11/15/23    Anahy Mata is a right-hand dominant 43 y.o. female who presents today with  right elbow pain  for 9 weeks.   Patient presents today for follow-up of right elbow lateral epicondylitis. She reports she has been compliant in wearing the wrist and counterforce braces. She has been taking meloxicam as needed as well with little relief. She reports today for further treatment options.    Past Medical History:   History reviewed. No pertinent past medical history.  Past Surgical History:   Procedure Laterality Date    CHOLECYSTECTOMY      NISSEN FUNDOPLICATION  2005    TUBAL LIGATION       Family History   Problem Relation Age of Onset    Hypertension Mother     No Known Problems Father      Social History     Socioeconomic History    Marital status:    Tobacco Use    Smoking status: Former     Current packs/day: 0.00     Types: Cigarettes     Quit date: 2011     Years since quittin.5    Smokeless tobacco: Never   Substance and Sexual Activity    Alcohol use: No    Drug  use: No    Sexual activity: Yes     Partners: Male     Medication List with Changes/Refills   Current Medications    MELOXICAM (MOBIC) 15 MG TABLET    TAKE 1 TABLET (15 MG TOTAL) BY MOUTH ONCE DAILY.    NALTREXONE-BUPROPION (CONTRAVE) 8-90 MG TBSR    Take 1 tablet with breakfast for 1 week, then 1 with breakfast and 1 with dinner for 1 week, then 2 with breakfast and 1 with dinner for 1 week, then 2 twice daily thereafter.    NITROFURANTOIN, MACROCRYSTAL-MONOHYDRATE, (MACROBID) 100 MG CAPSULE    Take 1 capsule (100 mg total) by mouth 2 (two) times daily.     Review of patient's allergies indicates:  No Known Allergies    Physical Exam:   Body mass index is 23.38 kg/m².    GENERAL: Well appearing, in no acute distress.  HEAD: Normocephalic and atraumatic.  ENT: External ears and nose grossly normal.  EYES: EOMI bilaterally  PULMONARY: Respirations are grossly even and non-labored.  NEURO: Awake, alert, and oriented x 3.  SKIN: No obvious rashes appreciated.  PSYCH: Mood & affect are appropriate.    Detailed MSK exam:     TTP Lateral epicondyle, common extensor tendon   No atrophy of thenar eminance noted on exam     Tinels ulnar nerve (-)  Tinels carpal tunnel (-)    Thumb adduction 5/5  Froment's sign (-)  Finger adduction (-)    Imaging:  X-Ray Elbow Complete Right  Narrative: EXAMINATION:  XR ELBOW COMPLETE 3 VIEW RIGHT    CLINICAL HISTORY:  . Pain in right elbow    TECHNIQUE:  AP, lateral, and oblique views of the right elbow were performed.    COMPARISON:  None    FINDINGS:  No acute abnormality or significant arthritic change.  Impression: As above    Electronically signed by: Nathaniel Sommer MD  Date:    10/09/2023  Time:    10:41    Relevant imaging results were reviewed and interpreted by me and per my read as above.  This was discussed with the patient and / or family today.     Assessment:  Anahy Mata is a 43 y.o. female presents today for recurrent symptoms of the right lateral elbow.  Status  post corticosteroid injection about 2 months ago with about 4-6 weeks relief.  Wearing off now still similar symptoms holding objects with some things and symptoms located solely to the lateral epicondyle.  Review of previous point of care ultrasound images show a small focal area of tendinosis.  No tears appreciated.  We he discussed potential need for interventions in the future discussed PRP or TENJET.  She will start with formal therapy for least the next 6 weeks at [facility] with occupational therapy.  Continue ergonomic changes at work wrist bracing at night and counterforce bracing.  She is having some symptoms of the ulnar nerve right now as well.  Extending down into the pinky finger and ring finger.  Discussed avoiding full flexion trying to work on extension throughout the day and ergonomic changes again at work.  Consider EMG if persistent symptoms.  No atrophy noted today no weakness.    Lateral epicondylitis of right elbow  -     Ambulatory referral/consult to Physical/Occupational Therapy; Future; Expected date: 01/24/2024    Ulnar neuropathy at elbow of right upper extremity       I spent a total of 43 minutes on the day of the visit.  This includes face to face time and non-face to face time preparing to see the patient (eg, review of tests), obtaining and/or reviewing separately obtained history, documenting clinical information in the electronic or other health record, independently interpreting results and communicating results to the patient/family/caregiver, or care coordinator.        Anupam Smith MD    Disclaimer: This note was prepared using a voice recognition system and is likely to have sound alike errors within the text.     I, Jordin Jimenez, acted as a scribe for Anupam Smith MD for the duration of this office visit.

## 2024-01-17 NOTE — PATIENT INSTRUCTIONS
Assessment:  Anahy Mata is a 43 y.o. female   Chief Complaint   Patient presents with    Right Upper Arm - Pain       No diagnosis found.     Plan:  Discussed with patient starting occupational therapy at [facility]  Continue to use bracing at night and during the day  You were prescribed meloxicam today as an anti-inflammatory medicine for the pain you are having.  Please take this medicine once a day with food for 2 weeks, and then as needed for days with significant recurrent pain.  Please do not take any other anti-inflammatories such as naproxen, ibuprofen, Aleve at the same time you are taking this medicine.  Here is some general information on TenJet procedure. You may also watch the video on this link to see an overview of how the procedure goes as well.   [employer] Video                          [email or web address]        Follow-up: 6-8 weeks or sooner if there are any problems between now and then.    Thank you for choosing [facility] and Dr. Anupam Smith for your orthopedic & sports medicine care. It is our goal to provide you with exceptional care that will help keep you healthy, active, and get you back in the game.    Please do not hesitate to reach out to us via email, phone, or MyChart with any questions, concerns, or feedback.    If you felt that you received exemplary care today, please consider leaving us feedback on Healthgrades at:  [email or web address]    If you are experiencing pain/discomfort ,or have questions after 5pm and would like to be connected to the [facility] [place] on-call team, please call this number and specify which Sports Medicine provider is treating you: [phone]

## 2024-01-25 ENCOUNTER — CLINICAL SUPPORT (OUTPATIENT)
Dept: REHABILITATION | Facility: HOSPITAL | Age: 44
End: 2024-01-25
Attending: STUDENT IN AN ORGANIZED HEALTH CARE EDUCATION/TRAINING PROGRAM
Payer: COMMERCIAL

## 2024-01-25 DIAGNOSIS — M25.521 CHRONIC ELBOW PAIN, RIGHT: Primary | ICD-10-CM

## 2024-01-25 DIAGNOSIS — R29.898 DECREASED GRIP STRENGTH OF RIGHT HAND: ICD-10-CM

## 2024-01-25 DIAGNOSIS — G89.29 CHRONIC ELBOW PAIN, RIGHT: Primary | ICD-10-CM

## 2024-01-25 DIAGNOSIS — M77.11 LATERAL EPICONDYLITIS OF RIGHT ELBOW: ICD-10-CM

## 2024-01-25 PROCEDURE — 97535 SELF CARE MNGMENT TRAINING: CPT | Mod: PN

## 2024-01-25 PROCEDURE — 97165 OT EVAL LOW COMPLEX 30 MIN: CPT | Mod: PN

## 2024-01-25 PROCEDURE — 97110 THERAPEUTIC EXERCISES: CPT | Mod: PN

## 2024-01-25 NOTE — PLAN OF CARE
[facility]  Occupational Therapy Initial Evaluation     Date: 1/25/2024  Name: Anahy Mata  Clinic Number: [id number]    Therapy Diagnosis:   Encounter Diagnoses   Name Primary?    Lateral epicondylitis of right elbow     Chronic elbow pain, right Yes    Decreased  strength of right hand      Physician: Anupam Smith MD    Physician Orders: OT eval and tx  Medical Diagnosis: Lateral epicondylitis of right elbow [M77.11]   Evaluation Date: 1/25/2024  Insurance Authorization Period Expiration: 12/31/2024  Plan of Care Certification Period: 1/25/2024 to 3/22/2024    Visit # / Visits authorized: 1/1  FOTO: 1/3    Surgical Procedure: none     Date of Return to MD: 2/28/2024    Precautions:  Standard    Time In: 1400  Time Out: 1445  Total Appointment Time (timed & untimed codes): 45 minutes    Subjective     Involved Side: right  Dominant Side: Right    Date of Onset: 7-8 months  Mechanism of Injury/ History of Current Condition: Pt reports right elbow pain for past 7-8 months. She reports that she and her  like to hunt and they were pulling boards off a trailer one day, and she woke up the next day with pain that never went away. She reports that she's tried anti-inflammatories, ice, and heat, but nothing is really helping. She reports she feels like she needs to pop her LF all the time and that she is starting to get some pins/needles in her RF and SF. She reports that her right  is weaker and she'll drop stuff.    Falls: no    Per MD Notes on 1/17/2024: Anahy Mata is a 43 y.o. female presents today for recurrent symptoms of the right lateral elbow.  Status post corticosteroid injection about 2 months ago with about 4-6 weeks relief.  Wearing off now still similar symptoms holding objects with some things and symptoms located solely to the lateral epicondyle.  Review of previous point of care ultrasound images show a small focal area of tendinosis.  No tears  "appreciated.  We he discussed potential need for interventions in the future discussed PRP or TENJET.  She will start with formal therapy for least the next 6 weeks at [facility] with occupational therapy.  Continue ergonomic changes at work wrist bracing at night and counterforce bracing.  She is having some symptoms of the ulnar nerve right now as well.  Extending down into the pinky finger and ring finger.  Discussed avoiding full flexion trying to work on extension throughout the day and ergonomic changes again at work.  Consider EMG if persistent symptoms.  No atrophy noted today no weakness.     Imaging: X-ray on 10/9/2023: No acute abnormality or significant arthritic change.     Previous Therapy: none    Patient's Goals for Therapy: "decrease my elbow pain"    Pain:  Functional Pain Scale Rating 0-10:   5/10 on average  2/10 at best  10+/10 at worst  Location: right elbow  Description: Aching and Variable  Aggravating Factors: Extension and gripping  Easing Factors: rest    Functional Limitations/Social History:    Previous Functional Status: Independent with all ADL/IADL tasks     Current Functional Status: right arm pain limits ability to carry, use, or hold anything with ADL/IADL tasks    Home/Living environment: lives with her spouse     Driving: yes    Leisure: Hunting    Occupation:   Working presently: employed  Duties: working at a computer        Past Medical History/Physical Systems Review:   Medical History:   No past medical history on file.    Surgical History:    has a past surgical history that includes Tubal ligation; Cholecystectomy; and Nissen fundoplication (2005).    Medications:   has a current medication list which includes the following prescription(s): meloxicam, contrave, and nitrofurantoin (macrocrystal-monohydrate).    Allergies:   Review of patient's allergies indicates:  No Known Allergies       Objective     Posture: rounded shoulders    Sensation: Pt denies " hypersensitivity. Intact to light touch along median nerve distribution. Paresthesias reported in right RF and SF.       Right Upper Extremity Active Range of Motion:  WNL as compared to LUE    Right Wrist Strength and Provocative Testing:   Strength Pain    1/25/2024 1/25/2024   ECRB 4/5 yes   ECU 4/5 little   FCR 5/5 no   FCU 4+/5 no     Stress position: (elbow extended, wrist flexed)    Left: 72   Right: 54    Stress Position  Strength (in pounds):  Setting II   Right Left   Elbow Flexed - Point of pain 21 65   Elbow Flexed - Max  36 65   Elbow Extended Pronated Point of pain 15 58   Elbow Extended Pronated Max  26 58       Intake Outcome Measure for FOTO Elbow Survey    Therapist reviewed FOTO scores for Anahy Mata on 1/25/2024.   FOTO documents entered into [employer] - see Media section.    Intake Score: 48%     Predicted Functional Score: 63%     Treatment     Total Treatment time (time-based codes) separate from Evaluation: 25 minutes    Anahy received the treatments listed below:      therapeutic exercises to develop ROM for 10 minutes including:  - HEP: extrinsic stretches    self-care techniques to improve independence and safety with ADL/IADL tasks for 15 minutes including:  - precautions for lateral epicondylitis  - precautions for ulnar nerve compression  - use of ice for inflammation  - use of wrist brace and counterforce brace  - use of elbow pad  - computer workstation setup    Home Exercise Program/Education:    Education provided:   - Role of OT, goals for OT, scheduling/cancellations, therapy attendance policy    Written Home Exercises Provided: Yes  Exercises were reviewed and Anahy was able to demonstrate them prior to the end of the session. Anahy demonstrated good understanding of the education provided. See EMR under Patient Instructions for exercises provided during therapy sessions.     Pt was advised to perform these exercises free of pain, and to stop performing them if pain  occurs.    Assessment     Anahy Mata is a 43 y.o. female referred to outpatient occupational therapy and presents with a medical diagnosis of Lateral epicondylitis of right elbow [M77.11]. Patient presents with the following therapy deficits: Decreased  strength, Decreased muscle strength, Decreased functional hand use, Increased pain, and Diminished/Impaired Sensation and demonstrates limitations as described in the chart below.     Following medical record review, it is determined that the pt will benefit from occupational therapy services in order to maximize pain free and/or functional use of her right UE. The following goals were discussed with the patient and patient is in agreement with them as to be addressed in the treatment plan. The patient's rehab potential is Good.     Anticipated barriers to occupational therapy: chronic symptoms  Pt has no cultural, educational or language barriers to learning provided.    Medical Necessity is demonstrated by the following  Occupational Profile/History  Co-morbidities and personal factors that may impact the plan of care [x] LOW: Brief chart review  [] MODERATE: Expanded chart review   [] HIGH: Extensive chart review    Moderate / High Support Documentation: n/a     Examination  Performance deficits relating to physical, cognitive or psychosocial skills that result in activity limitations and/or participation restrictions  [] LOW: addressing 1-3 Performance deficits  [x] MODERATE: 3-5 Performance deficits  [] HIGH: 5+ Performance deficits (please support below)    Moderate / High Support Documentation:    Physical:  Muscle Power/Strength  Muscle Endurance   Strength  Gross Motor Coordination  Proprioception Functions  Postural Control  Pain    Cognitive:  Safety Awareness/Insight to Disability    Psychosocial:    Habits  Routines     Treatment Options [x] LOW: Limited options  [] MODERATE: Several options  [] HIGH: Multiple options      Decision  Making/ Complexity Score: low       The following goals were discussed with the patient and patient is in agreement with them as to be addressed in the treatment plan.     Goals:   Short Term Goals: (4 weeks)  1. Pt will be independent with HEP in 2 visits.  2. Pt will report decreased right elbow pain to a 3-4/10 with ADL/IADL tasks.  3. Pt will progress to extrinsic stretches with elbow extended in 5 visits.    4. Pt will report increase stress position wrist flexion on R to 60 degrees.   5. Pt will increase point of pain  testing on R with elbow flexed to 30#.   6. Pt will report an increase in FOTO intake score of > 50%, which would indicate an improvement in quality of life.    Long Term Goals: (8 weeks)  1. Pt will report decreased right elbow pain to 1-2/10 with ADL/IADL tasks.   2. Pt will tolerate full elbow extension with pronation to retrieve objects from workspace and shelving without pain.   3. Pt will report an increase in FOTO intake score of > 60%, which would indicate an improvement in quality of life.  4. Pt will be independent with self-management of future exacerbations.   5. Pt will increase max elbow extended  to 60# on R.  6. Pt will return to PLOF, including hunting and typing.      Plan   Plan of Care Certification: 1/25/2024 to 3/22/2024     Outpatient Occupational Therapy 1-2 times weekly for 8 weeks to include the following interventions PRN: Manual Therapy, Moist Heat/ Ice, Neuromuscular Re-ed, Orthotic Management and Training, Paraffin, Patient Education, Self Care, Therapeutic Activities, Therapeutic Exercise, and Ultrasound      LUCERO Crowell, KAYLA BOBO

## 2024-01-30 ENCOUNTER — CLINICAL SUPPORT (OUTPATIENT)
Dept: REHABILITATION | Facility: HOSPITAL | Age: 44
End: 2024-01-30
Payer: COMMERCIAL

## 2024-01-30 DIAGNOSIS — M25.521 CHRONIC ELBOW PAIN, RIGHT: Primary | ICD-10-CM

## 2024-01-30 DIAGNOSIS — G89.29 CHRONIC ELBOW PAIN, RIGHT: Primary | ICD-10-CM

## 2024-01-30 DIAGNOSIS — R29.898 DECREASED GRIP STRENGTH OF RIGHT HAND: ICD-10-CM

## 2024-01-30 PROCEDURE — 97140 MANUAL THERAPY 1/> REGIONS: CPT | Mod: PN

## 2024-01-30 PROCEDURE — 97110 THERAPEUTIC EXERCISES: CPT | Mod: PN

## 2024-01-30 PROCEDURE — 97035 APP MDLTY 1+ULTRASOUND EA 15: CPT | Mod: PN

## 2024-01-30 PROCEDURE — 97535 SELF CARE MNGMENT TRAINING: CPT | Mod: PN

## 2024-01-30 NOTE — PROGRESS NOTES
"  [facility]  Occupational Therapy Treatment Note     Date: 1/30/2024  Name: Anahy Mata  Clinic Number: [id number]    Therapy Diagnosis:   Encounter Diagnoses   Name Primary?    Chronic elbow pain, right Yes    Decreased  strength of right hand      Physician: Anupam Smith MD    Physician Orders: OT eval and tx  Medical Diagnosis: Lateral epicondylitis of right elbow [M77.11]   Evaluation Date: 1/25/2024  Insurance Authorization Period Expiration: 12/16/2025  Plan of Care Certification Period: 1/25/2024 to 3/22/2024     Visit # / Visits authorized: 1/20  FOTO: 1/3     Surgical Procedure: none                              Date of Return to MD: 2/28/2024     Precautions:  Standard    Time In: 1400  Time Out: 1503  Total Billable Time: 55 minutes    Subjective     Pt reports: "I've been trying to modify the way I do things. I've noticed that my forearm is sore now. I've been carrying things with my arms rather than my hands. I had to use ice last night."    she was compliant with home exercise program given on evaluation.  Response to previous treatment: fair  Functional change: none yet    Pain: 5/10 (5/10 on evaluation)  Location: right elbow    Objective   Objective measures updated at progress report unless specified.    Treatment     Anahy received the treatments listed below:      therapeutic exercises to develop strength and ROM for 20 minutes including:  - gentle wrist flexor and extensor stretching (progressing toward an extended elbow) - after each manual technique and modality  - gentle extrinsic rhythmic stretching - after each manual technique and modality  - attempted ulnar nerve glide but pt's hand went numb  - ulnar nerve flossing  - elbow flexion AROM with 1# DB, forearm 3 positions, x 10 reps each    manual therapy techniques were applied to RUE for 12 minutes including:  - TFM over common extensor tendon at lateral epicondyle  - IASTM over common " extensor bundle and FCU    neuromuscular re-education activities to improve Coordination and Proprioception for 0 minutes including:  - NT    therapeutic activities to improve functional performance for 0 minutes including:  - NT    direct contact modalities after being cleared for contraindications for 8 minutes including:  - Ultrasound:  3 Mhz, 20% duty cycle, 1.0 w/cm2 for 8 minutes at common extensor tendon origin to decrease pain and improve circulation    supervised modalities after being cleared for contradictions for 6 minutes including:  - cold pack to right elbow at end of session to decrease pain and inflammation    Home Exercises and Education Provided     Education provided: (self-care [id number] x 15 minutes)  - reviewed HEP issued at evaluation  - reviewed anatomy of elbow and cubital tunnel  - reviewed precautions for lateral epicondylitis and cubital tunnel syndrome  - encouraged pt to splint elbow in ~30* extension with use of a pillow or towel while sleeping  - progress toward goals    Written Home Exercises Provided: Patient instructed to cont prior HEP  Exercises were reviewed and Anahy was able to demonstrate them prior to the end of the session. Anahy demonstrated good understanding of the HEP provided.     See EMR under Patient Instructions for exercises provided during prior visit.        Assessment     Anahy would continue to benefit from skilled OT. She was seen for her 1st tx session on this date. Tolerance limited 2* alternating symptoms of lateral epicondylitis and ulnar nerve compression at the cubital tunnel. She described a popping sensation at her elbow with ROM as well as an achy feeling at her lateral epicondyle and common extensor muscle bellies but also at her cubital tunnel and FCU. She indicates achy pain all around posterior elbow but not radiating down her forearm. She also experienced numbness in her entire right hand, but hill in RF and SM during attempted ulnar nerve  glides and postioning with flexed elbow.    Anahy is progressing towards her goals and there are no updates to goals at this time. Pt prognosis is Good.     Pt will continue to benefit from skilled outpatient occupational therapy to address the deficits listed in the problem list on initial evaluation provide pt/family education and to maximize pt's level of independence in the home and community environment.     Pt's spiritual, cultural and educational needs considered and pt agreeable to plan of care and goals.    Anticipated barriers to occupational therapy: chronic symptoms    Goals:  Short Term Goals: (4 weeks)  1. Pt will be independent with HEP in 2 visits.  2. Pt will report decreased right elbow pain to a 3-4/10 with ADL/IADL tasks.  3. Pt will progress to extrinsic stretches with elbow extended in 5 visits.    4. Pt will report increase stress position wrist flexion on R to 60 degrees.   5. Pt will increase point of pain  testing on R with elbow flexed to 30#.   6. Pt will report an increase in FOTO intake score of > 50%, which would indicate an improvement in quality of life.     Long Term Goals: (8 weeks)  1. Pt will report decreased right elbow pain to 1-2/10 with ADL/IADL tasks.   2. Pt will tolerate full elbow extension with pronation to retrieve objects from workspace and shelving without pain.   3. Pt will report an increase in FOTO intake score of > 60%, which would indicate an improvement in quality of life.  4. Pt will be independent with self-management of future exacerbations.   5. Pt will increase max elbow extended  to 60# on R.  6. Pt will return to PLOF, including hunting and typing.    Plan     Plan of Care Certification: 1/25/2024 to 3/22/2024      Outpatient Occupational Therapy 1-2 times weekly for 8 weeks to include the following interventions PRN: Manual Therapy, Moist Heat/ Ice, Neuromuscular Re-ed, Orthotic Management and Training, Paraffin, Patient Education, Self Care,  Therapeutic Activities, Therapeutic Exercise, and Ultrasound    Updates/Grading for next session: progress occupational therapy as tolerated    LUCERO Crowell, JIHAN, CHT

## 2024-02-06 ENCOUNTER — PATIENT MESSAGE (OUTPATIENT)
Dept: SPORTS MEDICINE | Facility: CLINIC | Age: 44
End: 2024-02-06
Payer: COMMERCIAL

## 2024-02-06 ENCOUNTER — CLINICAL SUPPORT (OUTPATIENT)
Dept: REHABILITATION | Facility: HOSPITAL | Age: 44
End: 2024-02-06
Payer: COMMERCIAL

## 2024-02-06 DIAGNOSIS — M77.11 LATERAL EPICONDYLITIS OF RIGHT ELBOW: Primary | ICD-10-CM

## 2024-02-06 DIAGNOSIS — R29.898 DECREASED GRIP STRENGTH OF RIGHT HAND: ICD-10-CM

## 2024-02-06 DIAGNOSIS — M25.521 CHRONIC ELBOW PAIN, RIGHT: Primary | ICD-10-CM

## 2024-02-06 DIAGNOSIS — G89.29 CHRONIC ELBOW PAIN, RIGHT: Primary | ICD-10-CM

## 2024-02-06 DIAGNOSIS — G56.21 ULNAR NEUROPATHY AT ELBOW OF RIGHT UPPER EXTREMITY: ICD-10-CM

## 2024-02-06 DIAGNOSIS — M77.11 LATERAL EPICONDYLITIS OF RIGHT ELBOW: ICD-10-CM

## 2024-02-06 PROCEDURE — 97035 APP MDLTY 1+ULTRASOUND EA 15: CPT | Mod: PN

## 2024-02-06 PROCEDURE — 97140 MANUAL THERAPY 1/> REGIONS: CPT | Mod: PN

## 2024-02-06 PROCEDURE — 97110 THERAPEUTIC EXERCISES: CPT | Mod: PN

## 2024-02-06 PROCEDURE — 97112 NEUROMUSCULAR REEDUCATION: CPT | Mod: PN

## 2024-02-06 PROCEDURE — 97535 SELF CARE MNGMENT TRAINING: CPT | Mod: PN

## 2024-02-06 RX ORDER — MELOXICAM 15 MG/1
15 TABLET ORAL DAILY
Qty: 30 TABLET | Refills: 1 | Status: SHIPPED | OUTPATIENT
Start: 2024-02-06

## 2024-02-06 NOTE — PROGRESS NOTES
"  [facility]  Occupational Therapy Treatment Note     Date: 2/6/2024  Name: Anahy Maat  Clinic Number: [id number]    Therapy Diagnosis:   Encounter Diagnoses   Name Primary?    Chronic elbow pain, right Yes    Decreased  strength of right hand      Physician: Anupam Smith MD    Physician Orders: OT eval and tx  Medical Diagnosis: Lateral epicondylitis of right elbow [M77.11]   Evaluation Date: 1/25/2024  Insurance Authorization Period Expiration: 12/16/2025  Plan of Care Certification Period: 1/25/2024 to 3/22/2024     Visit # / Visits authorized: 2/20  FOTO: 1/3     Surgical Procedure: none                              Date of Return to MD: 2/28/2024     Precautions:  Standard    Time In: 1400  Time Out: 1505  Total Billable Time: 60 minutes    Subjective     Pt reports: "I'm doing horrible. I didn't sleep at all last night because of my pain. The more I use it, the worse it feels. Today I've been resting it so it helps."    she was compliant with home exercise program given on evaluation.  Response to previous treatment: fair  Functional change: slightly decreased pain - messaged MD about pt's pain    Pain: 4/10   Location: right elbow    Objective   Objective measures updated at progress report unless specified.    Treatment     Anahy received the treatments listed below:      therapeutic exercises to develop strength and ROM for 15 minutes including:  - wrist flexor and extensor stretching (progressing toward an extended elbow) - after ultrasound  - gentle ulnar nerve flossing 2x10 (highly irritable) - 2 ways  - FCU stretching  - wrist isometric holds  - wrist eccentrics    manual therapy techniques were applied to RUE for 8 minutes including:  - IASTM over FCU and cubital tunnel    neuromuscular re-education activities to improve Posture for 10 minutes including:  - bilateral scap retraction AROM 2x10  - chin tucks 2x10 (prone and supine positions)    therapeutic " activities to improve functional performance for 0 minutes including:  - NT    direct contact modalities after being cleared for contraindications for 8 minutes including:  - Ultrasound:  3 Mhz, 20% duty cycle, 1.0 w/cm2 for 8 minutes at common extensor tendon origin to decrease pain and improve circulation    supervised modalities after being cleared for contradictions for 0 minutes including:    Home Exercises and Education Provided     Education provided: (self-care 97535 x 10 minutes)  - reviewed anatomy of cubital tunnel and ulnar nerve compression  - reviewed precautions for cubital tunnel syndrome  - progress toward goals    Written Home Exercises Provided: Patient instructed to cont prior HEP  Exercises were reviewed and Anahy was able to demonstrate them prior to the end of the session. Anahy demonstrated good understanding of the HEP provided.     See EMR under Patient Instructions for exercises provided during prior visit.        Assessment     Anahy would continue to benefit from skilled OT. She was seen for her 3nd tx session on this date. Tolerance again limited 2* ulnar nerve compression at the cubital tunnel but not as much from tennis elbow symptoms. She was able to progress to almost a fully extended elbow with extrinsic stretches for tennis elbow, but her hand would go numb with ulnar nerve glides for ulnar nerve compression. Messagefelicita TYLER regarding ongoing pain.    Anahy is progressing towards her goals and there are no updates to goals at this time. Pt prognosis is Good.     Pt will continue to benefit from skilled outpatient occupational therapy to address the deficits listed in the problem list on initial evaluation provide pt/family education and to maximize pt's level of independence in the home and community environment.     Pt's spiritual, cultural and educational needs considered and pt agreeable to plan of care and goals.    Anticipated barriers to occupational therapy: chronic  symptoms    Goals:  Short Term Goals: (4 weeks)  1. Pt will be independent with HEP in 2 visits. - progressing, continue goal 2/6/2024  2. Pt will report decreased right elbow pain to a 3-4/10 with ADL/IADL tasks. - progressing, continue goal 2/6/2024  3. Pt will progress to extrinsic stretches with elbow extended in 5 visits.    4. Pt will report increase stress position wrist flexion on R to 60 degrees.   5. Pt will increase point of pain  testing on R with elbow flexed to 30#.   6. Pt will report an increase in FOTO intake score of > 50%, which would indicate an improvement in quality of life.     Long Term Goals: (8 weeks)  1. Pt will report decreased right elbow pain to 1-2/10 with ADL/IADL tasks.   2. Pt will tolerate full elbow extension with pronation to retrieve objects from workspace and shelving without pain.   3. Pt will report an increase in FOTO intake score of > 60%, which would indicate an improvement in quality of life.  4. Pt will be independent with self-management of future exacerbations.   5. Pt will increase max elbow extended  to 60# on R.  6. Pt will return to PLOF, including hunting and typing.    Plan     Plan of Care Certification: 1/25/2024 to 3/22/2024      Outpatient Occupational Therapy 1-2 times weekly for 8 weeks to include the following interventions PRN: Manual Therapy, Moist Heat/ Ice, Neuromuscular Re-ed, Orthotic Management and Training, Paraffin, Patient Education, Self Care, Therapeutic Activities, Therapeutic Exercise, and Ultrasound    Updates/Grading for next session: progress occupational therapy as tolerated    LUCERO Crowell, JIHAN, DAVIONT

## 2024-02-13 ENCOUNTER — CLINICAL SUPPORT (OUTPATIENT)
Dept: REHABILITATION | Facility: HOSPITAL | Age: 44
End: 2024-02-13
Payer: COMMERCIAL

## 2024-02-13 DIAGNOSIS — R29.898 DECREASED GRIP STRENGTH OF RIGHT HAND: ICD-10-CM

## 2024-02-13 DIAGNOSIS — M25.521 CHRONIC ELBOW PAIN, RIGHT: Primary | ICD-10-CM

## 2024-02-13 DIAGNOSIS — G89.29 CHRONIC ELBOW PAIN, RIGHT: Primary | ICD-10-CM

## 2024-02-13 PROCEDURE — 97140 MANUAL THERAPY 1/> REGIONS: CPT | Mod: PN

## 2024-02-13 PROCEDURE — 97035 APP MDLTY 1+ULTRASOUND EA 15: CPT | Mod: PN

## 2024-02-13 PROCEDURE — 97535 SELF CARE MNGMENT TRAINING: CPT | Mod: PN

## 2024-02-13 PROCEDURE — 97110 THERAPEUTIC EXERCISES: CPT | Mod: PN

## 2024-02-13 NOTE — PROGRESS NOTES
"  [facility]  Occupational Therapy Treatment Note     Date: 2/13/2024  Name: Anahy Mata  Clinic Number: [id number]    Therapy Diagnosis:   Encounter Diagnoses   Name Primary?    Chronic elbow pain, right Yes    Decreased  strength of right hand      Physician: Anupam Smith MD    Physician Orders: OT eval and tx  Medical Diagnosis: Lateral epicondylitis of right elbow [M77.11]   Evaluation Date: 1/25/2024  Insurance Authorization Period Expiration: 12/16/2025  Plan of Care Certification Period: 1/25/2024 to 3/22/2024     Visit # / Visits authorized: 3/20  FOTO: 1/3     Surgical Procedure: none                              Date of Return to MD: NCS on 2/20/2024; follow-up with MD on 2/21/2024     Precautions:  Standard    Time In: 1400  Time Out: 1505  Total Billable Time: 55 minutes    Subjective     Pt reports: "I'm feeling ok today because it's been a slow day. Yesterday wasn't a good day. I've tried using the Voltaren gel but it doesn't help."    she was compliant with home exercise program given on evaluation.  Response to previous treatment: good  Functional change: decreased pain    Pain: 2/10   Location: right elbow    Objective   Objective measures updated at progress report unless specified.    Treatment     Anahy received the treatments listed below:      therapeutic exercises to develop strength and ROM for 25 minutes including:  - wrist extrinsic stretches (flexors, extensor stretching (progressing toward an extended elbow) - (after US, TFM, IASTM, and exercises)  - elbow active range of motion with dowel, 3 FA positions, 10x ea  - FA rotation with dowel with elbow flexed, 20x  - FA rotation with dowel with elbow extended, 20x  - wrist flex/ext with dowel in neutral with elbow flexed, 15x  - wrist flex/ext with dowel with FA pronated and elbow flexed, 15x  - wrist flex/ext with dowel in neutral with elbow extended, 15x  - wrist flex/ext with dowel with FA " pronated and elbow extended, 10x  - active fist with FA neutral and elbow flexed, 15x  - active fist with FA pronated and and elbow flexed, 15x  - active fist with FA neutral and elbow extended, 15x  - active fist with FA pronated and and elbow extended, 10x     manual therapy techniques were applied to RUE for 10 minutes including:  - TFM over common extensor tendon at lateral epicondyle  - IASTM over common extensor bundle    neuromuscular re-education activities to improve Posture for 0 minutes including:    therapeutic activities to improve functional performance for 0 minutes including:  - NT    direct contact modalities after being cleared for contraindications for 8 minutes including:  - Ultrasound:  3 Mhz, 20% duty cycle, 1.0 w/cm2 for 8 minutes at common extensor tendon origin to decrease pain and improve circulation    supervised modalities after being cleared for contradictions for 10 minutes including:  - cold pack to right elbow at end of session to decrease pain and inflammation    Home Exercises and Education Provided     Education provided: (self-care 97535 x 10 minutes)   - reviewed elbow and common extensor bundle anatomy  - reviewed use of counteforce brace  - reviewed use of ice/cold for pain and inflammation  - educated on how to perform TFM  - progress toward goals    Written Home Exercises Provided: Patient instructed to cont prior HEP  Exercises were reviewed and Anahy was able to demonstrate them prior to the end of the session. Anahy demonstrated good understanding of the HEP provided.     See EMR under Patient Instructions for exercises provided during prior visit.        Assessment     Anahy would continue to benefit from skilled OT. She was seen for her 4th tx session on this date. She was limited by tennis elbow pain but not ulnar nerve/cubital tunnel pain. Her symptoms continue to fluctuate between treatment sessions. She also localized pain below lateral epicondyle rather than  above or beside it, but continues to have pain with resisted wrist extension. Despite this, she tolerated session well. Tx focused on extrinsic stretching and strengthening as well as modalities and manual techniques for pain. She is scheduled for a NCS on 2/20 and a follow-up apt with her MD the next day.    Anahy is progressing towards her goals and there are no updates to goals at this time. Pt prognosis is Good.     Pt will continue to benefit from skilled outpatient occupational therapy to address the deficits listed in the problem list on initial evaluation provide pt/family education and to maximize pt's level of independence in the home and community environment.     Pt's spiritual, cultural and educational needs considered and pt agreeable to plan of care and goals.    Anticipated barriers to occupational therapy: chronic symptoms    Goals:  Short Term Goals: (4 weeks)  1. Pt will be independent with HEP in 2 visits. - MET 2/13/2024  2. Pt will report decreased right elbow pain to a 3-4/10 with ADL/IADL tasks. - MET 2/13/2024  3. Pt will progress to extrinsic stretches with elbow extended in 5 visits. - progressing, continue goal 2/13/2024    4. Pt will report increase stress position wrist flexion on R to 60 degrees.   5. Pt will increase point of pain  testing on R with elbow flexed to 30#.   6. Pt will report an increase in FOTO intake score of > 50%, which would indicate an improvement in quality of life.     Long Term Goals: (8 weeks)  1. Pt will report decreased right elbow pain to 1-2/10 with ADL/IADL tasks.   2. Pt will tolerate full elbow extension with pronation to retrieve objects from workspace and shelving without pain.   3. Pt will report an increase in FOTO intake score of > 60%, which would indicate an improvement in quality of life.  4. Pt will be independent with self-management of future exacerbations.   5. Pt will increase max elbow extended  to 60# on R.  6. Pt will return to  PLOF, including hunting and typing.    Plan     Plan of Care Certification: 1/25/2024 to 3/22/2024      Outpatient Occupational Therapy 1-2 times weekly for 8 weeks to include the following interventions PRN: Manual Therapy, Moist Heat/ Ice, Neuromuscular Re-ed, Orthotic Management and Training, Paraffin, Patient Education, Self Care, Therapeutic Activities, Therapeutic Exercise, and Ultrasound    Updates/Grading for next session: progress occupational therapy as tolerated    LUCERO Crowell, JIHAN, CHT

## 2024-02-20 ENCOUNTER — PROCEDURE VISIT (OUTPATIENT)
Dept: NEUROLOGY | Facility: CLINIC | Age: 44
End: 2024-02-20
Payer: COMMERCIAL

## 2024-02-20 ENCOUNTER — CLINICAL SUPPORT (OUTPATIENT)
Dept: REHABILITATION | Facility: HOSPITAL | Age: 44
End: 2024-02-20
Payer: COMMERCIAL

## 2024-02-20 DIAGNOSIS — G56.21 ULNAR NEUROPATHY AT ELBOW OF RIGHT UPPER EXTREMITY: ICD-10-CM

## 2024-02-20 DIAGNOSIS — G89.29 CHRONIC ELBOW PAIN, RIGHT: Primary | ICD-10-CM

## 2024-02-20 DIAGNOSIS — M25.521 CHRONIC ELBOW PAIN, RIGHT: Primary | ICD-10-CM

## 2024-02-20 DIAGNOSIS — R29.898 DECREASED GRIP STRENGTH OF RIGHT HAND: ICD-10-CM

## 2024-02-20 PROCEDURE — 97110 THERAPEUTIC EXERCISES: CPT | Mod: PN

## 2024-02-20 PROCEDURE — 95910 NRV CNDJ TEST 7-8 STUDIES: CPT | Mod: S$GLB,,, | Performed by: PSYCHIATRY & NEUROLOGY

## 2024-02-20 PROCEDURE — 97035 APP MDLTY 1+ULTRASOUND EA 15: CPT | Mod: PN

## 2024-02-20 PROCEDURE — 97140 MANUAL THERAPY 1/> REGIONS: CPT | Mod: PN

## 2024-02-20 NOTE — PROGRESS NOTES
"  [facility]  Occupational Therapy Treatment Note     Date: 2/20/2024  Name: Anahy Mata  Clinic Number: [id number]    Therapy Diagnosis:   Encounter Diagnoses   Name Primary?    Chronic elbow pain, right Yes    Decreased  strength of right hand      Physician: Anupam Smith MD    Physician Orders: OT eval and tx  Medical Diagnosis: Lateral epicondylitis of right elbow [M77.11]   Evaluation Date: 1/25/2024  Insurance Authorization Period Expiration: 12/16/2025  Plan of Care Certification Period: 1/25/2024 to 3/22/2024     Visit # / Visits authorized: 4/20  FOTO: 1/3     Surgical Procedure: none                              Date of Return to MD: NCS on 2/20/2024; follow-up with MD on 2/21/2024     Precautions:  Standard    Time In: 1400  Time Out: 1505  Total Billable Time: 55 minutes    Subjective     Pt reports: "The more I use it, the more it bothers me. I took it easy this weekend, so it's not bothering me too badly right now. I just came from my NCS. It shows everything is normal."    she was compliant with home exercise program given on evaluation.  Response to previous treatment: good  Functional change: improved tolerance for resistance    Pain: 2-3/10   Location: right elbow    Objective   Objective measures updated at progress report unless specified.    Treatment     Anahy received the treatments listed below:      therapeutic exercises to develop strength and ROM for 30 minutes including:  - wrist extrinsic stretches (flexors, extensor stretching (progressing toward an extended elbow) - (after US, TFM, IASTM, and exercises)  - elbow active range of motion with 1# DB, 3 FA positions, 10x ea  - FA rotation with 1# DB with elbow flexed, 20x  - FA rotation with dowel with elbow extended, 20x  - wrist flex/ext with 1# DB in neutral with elbow flexed, 15x  - wrist flex/ext with 1# DB with FA pronated and elbow flexed, 15x  - wrist flex/ext with dowel in neutral " with elbow extended, 15x  - wrist flex/ext with dowel with FA pronated and elbow extended, 15x  - composite fisting with red sponge with FA neutral and elbow flexed, 15x  - composite fisting with red sponge with FA pronated and and elbow flexed, 15x  - composite fisting with yellow sponge with FA neutral and elbow extended, 15x  - composite fisting with yellow sponge with FA pronated and and elbow extended, 15x     manual therapy techniques were applied to RUE for 15 minutes including:  - TFM over common extensor tendon at lateral epicondyle to decrease inflammation and pain  - IASTM over common extensor bundle to decrease pain and increase circulation    neuromuscular re-education activities to improve Posture for 0 minutes including:  - NT    therapeutic activities to improve functional performance for 0 minutes including:  - NT    direct contact modalities after being cleared for contraindications for 8 minutes including:  - Ultrasound:  3 Mhz, 20% duty cycle, 1.0 w/cm2 for 8 minutes at common extensor tendon origin to decrease pain and improve circulation    supervised modalities after being cleared for contradictions for 10 minutes including:  - cold pack to right elbow at end of session to decrease pain and inflammation    Home Exercises and Education Provided     Education provided:  - progress toward goals    Written Home Exercises Provided: Patient instructed to cont prior HEP  Exercises were reviewed and Anahy was able to demonstrate them prior to the end of the session. Anahy demonstrated good understanding of the HEP provided.     See EMR under Patient Instructions for exercises provided during prior visit.        Assessment     Anahy would continue to benefit from skilled OT. She had her NCS prior to today's session, which revealed a normal study and no ulnar nerve compression at cubital tunnel. She tolerated extrinsic stretches better and has almost progressed to a fully extended elbow with her  stretches. She also tolerated an increase in reps and resistance during exercises without increased pain. She is making steady progress but remains frustrated with ongoing residual pain.     Anahy is progressing towards her goals and there are no updates to goals at this time. Pt prognosis is Good.     Pt will continue to benefit from skilled outpatient occupational therapy to address the deficits listed in the problem list on initial evaluation provide pt/family education and to maximize pt's level of independence in the home and community environment.     Pt's spiritual, cultural and educational needs considered and pt agreeable to plan of care and goals.    Anticipated barriers to occupational therapy: chronic symptoms    Goals:  Short Term Goals: (4 weeks)  1. Pt will be independent with HEP in 2 visits. - MET 2/13/2024  2. Pt will report decreased right elbow pain to a 3-4/10 with ADL/IADL tasks. - MET 2/13/2024  3. Pt will progress to extrinsic stretches with elbow extended in 5 visits. - progressing, continue goal 2/13/2024    4. Pt will report increase stress position wrist flexion on R to 60 degrees.   5. Pt will increase point of pain  testing on R with elbow flexed to 30#.   6. Pt will report an increase in FOTO intake score of > 50%, which would indicate an improvement in quality of life.     Long Term Goals: (8 weeks)  1. Pt will report decreased right elbow pain to 1-2/10 with ADL/IADL tasks.   2. Pt will tolerate full elbow extension with pronation to retrieve objects from workspace and shelving without pain.   3. Pt will report an increase in FOTO intake score of > 60%, which would indicate an improvement in quality of life.  4. Pt will be independent with self-management of future exacerbations.   5. Pt will increase max elbow extended  to 60# on R.  6. Pt will return to PLOF, including hunting and typing.    Plan     Plan of Care Certification: 1/25/2024 to 3/22/2024      Outpatient  Occupational Therapy 1-2 times weekly for 8 weeks to include the following interventions PRN: Manual Therapy, Moist Heat/ Ice, Neuromuscular Re-ed, Orthotic Management and Training, Paraffin, Patient Education, Self Care, Therapeutic Activities, Therapeutic Exercise, and Ultrasound    Updates/Grading for next session: progress occupational therapy as tolerated    LUCERO Crowell, JIHAN, CHT

## 2024-02-20 NOTE — PROCEDURES
[facility]   Magda Diaz  Department of Neurology  [address] [place] [address] PETTY Child  [id number]  Phone [phone]     Fax  [phone]        Full Name: Anahy Mata YOB: 1980  Patient ID: [id number]      Visit Date: 2/20/2024 12:56 PM  Age: 43 Years  Examining Physician: Dai Zapata M.D.  Referring Physician: Anupam Smith MD  History: NCS/RUE/Ulnar workup.  C/O: Numbness, tingling, and weakness along right ulnar nerve distribution.  PMHX: Right lateral epicondyle joint aspiration/injection on 11/15/23, hx of shingles.    SUMMARY     Nerve conduction studies were performed in the right upper extremity. The right median motor study recording the abductor pollicis brevis showed a normal amplitude, normal distal latency and normal conduction velocity. The right ulnar motor study recording the abductor digiti minimi showed a normal amplitude, normal distal latency and normal conduction velocity. No conduction block or focal slowing was present across the elbow.  The right ulnar motor study recording the first dorsal interosseous showed a normal amplitude, normal distal latency and normal conduction velocity. No conduction block or focal slowing was present across the elbow.    The right median sensory response recording digit two showed a normal amplitude, latency and conduction velocity. The right ulnar sensory response recording digit five showed a normal amplitude, latency and conduction velocity.  The right median-ulnar mixed palmar sensory responses showed symmetric latencies. The right radial sensory response recording over the extensor snuff box showed a normal amplitude, latency and conduction velocity. The right dorsal ulnar sensory response recording volar webspace between digits four and five showed a normal amplitude, latency and conduction velocity.       IMPRRESSION     This is a normal study.     There is no electrophysiologic evidence of ulnar mononeuropathy in  the right upper extremity.    ---------------------------------             Dai Zapata M.D., F.A.A.N.      Diplomate, American Board of Psychiatry and Neurology  Diplomate, American Board of Clinical Neurophysiology  Fellow, American Academy of Neurology             SENSORY NCS      Nerve / Sites Rec. Site Peak NP Amp PP Amp Dist Ming d Lat.2     ms µV µV cm m/s ms   R Median - Dig II      Wrist II 2.75 22.0 23.5 13 61.8 2.75      Ref.  3.40  20.0  48.0    R Median - Ulnar - Palmar      Median Wrist 1.83 57.6 68.2 8 60.0 1.83      Ulnar Wrist 2.10 24.5 25.8 8 548.6 0.27   R Ulnar - Dig V      Wrist Dig V 2.85 13.4 21.8 11 48.4 2.85      Ref.  3.10  12.0  48.0    R Radial - Snuff box      Forearm Snuff box 2.40 31.1 40.2 10 55.8 2.40      Ref.  2.70 18.0       R Ulnar - Dorsal      Wrist Dorsum 2.40 18.9 8.0 8 44.7 2.40      Ref.  3.10  12.0         Wrist Dorsum 2.38 13.1 20.6   2.38       MOTOR NCS      Nerve / Sites Rec. Site Lat Amp Dist Ming     ms mV cm m/s   R Median - APB      Wrist APB 2.60 10.4 8       Ref.  3.90 6.0        Elbow APB 5.88 7.0 18 55.0      Ref.     49.0   R Ulnar - ADM      Wrist ADM 2.17 9.6 8       Ref.  3.10 7.0        B.Elbow ADM 4.94 9.6 18.5 66.8      Ref.     50.0      A.Elbow ADM 6.56 9.5 10 61.5   R Ulnar - FDI      Wrist FDI 3.21 9.1 10       Ref.  4.30 7.0        B.Elbow FDI 6.33 8.6 20 64.0      A.Elbow FDI 7.88 8.4 10 64.9                       ---------------------------------             Dai Zapata M.D., F.A.A.N.      Diplomate, American Board of Psychiatry and Neurology  Diplomate, American Board of Clinical Neurophysiology  Fellow, American Academy of Neurology

## 2024-02-21 ENCOUNTER — OFFICE VISIT (OUTPATIENT)
Dept: SPORTS MEDICINE | Facility: CLINIC | Age: 44
End: 2024-02-21
Payer: COMMERCIAL

## 2024-02-21 VITALS — BODY MASS INDEX: 23.39 KG/M2 | HEIGHT: 63 IN | WEIGHT: 132 LBS

## 2024-02-21 DIAGNOSIS — M77.11 LATERAL EPICONDYLITIS OF RIGHT ELBOW: Primary | ICD-10-CM

## 2024-02-21 PROCEDURE — 1159F MED LIST DOCD IN RCRD: CPT | Mod: CPTII,S$GLB,, | Performed by: STUDENT IN AN ORGANIZED HEALTH CARE EDUCATION/TRAINING PROGRAM

## 2024-02-21 PROCEDURE — 99214 OFFICE O/P EST MOD 30 MIN: CPT | Mod: S$GLB,,, | Performed by: STUDENT IN AN ORGANIZED HEALTH CARE EDUCATION/TRAINING PROGRAM

## 2024-02-21 PROCEDURE — 99999 PR PBB SHADOW E&M-EST. PATIENT-LVL III: CPT | Mod: PBBFAC,,, | Performed by: STUDENT IN AN ORGANIZED HEALTH CARE EDUCATION/TRAINING PROGRAM

## 2024-02-21 PROCEDURE — 3008F BODY MASS INDEX DOCD: CPT | Mod: CPTII,S$GLB,, | Performed by: STUDENT IN AN ORGANIZED HEALTH CARE EDUCATION/TRAINING PROGRAM

## 2024-02-21 NOTE — PROGRESS NOTES
Patient ID: Anahy Mata  YOB: 1980  MRN: [id number]    Chief Complaint: Pain and Follow-up of the Right Elbow      History of Present Illness: Anahy Mata is a 43-year-old female presents today for follow-up right lateral elbow and EMG.  Still having some ulnar type symptoms EMG normal.  Still having lateral elbow pain with gripping holding objects has been compliant with occupational therapy seeing Radha at [facility].    Past Medical History:   History reviewed. No pertinent past medical history.  Past Surgical History:   Procedure Laterality Date    CHOLECYSTECTOMY      NISSEN FUNDOPLICATION  2005    TUBAL LIGATION       Family History   Problem Relation Age of Onset    Hypertension Mother     No Known Problems Father      Social History     Socioeconomic History    Marital status:    Tobacco Use    Smoking status: Former     Current packs/day: 0.00     Types: Cigarettes     Quit date: 2011     Years since quittin.6    Smokeless tobacco: Never   Substance and Sexual Activity    Alcohol use: No    Drug use: No    Sexual activity: Yes     Partners: Male     Medication List with Changes/Refills   Current Medications    MELOXICAM (MOBIC) 15 MG TABLET    Take 1 tablet (15 mg total) by mouth once daily.    NALTREXONE-BUPROPION (CONTRAVE) 8-90 MG TBSR    Take 1 tablet with breakfast for 1 week, then 1 with breakfast and 1 with dinner for 1 week, then 2 with breakfast and 1 with dinner for 1 week, then 2 twice daily thereafter.    NITROFURANTOIN, MACROCRYSTAL-MONOHYDRATE, (MACROBID) 100 MG CAPSULE    Take 1 capsule (100 mg total) by mouth 2 (two) times daily.     Review of patient's allergies indicates:  No Known Allergies    Physical Exam:   Body mass index is 23.38 kg/m².    GENERAL: Well appearing, in no acute distress.  HEAD: Normocephalic and atraumatic.  ENT: External ears and nose grossly normal.  EYES: EOMI bilaterally  PULMONARY: Respirations are grossly even and  non-labored.  NEURO: Awake, alert, and oriented x 3.  SKIN: No obvious rashes appreciated.  PSYCH: Mood & affect are appropriate.    Detailed MSK exam:     Deferred    Imaging:  X-Ray Elbow Complete Right  Narrative: EXAMINATION:  XR ELBOW COMPLETE 3 VIEW RIGHT    CLINICAL HISTORY:  . Pain in right elbow    TECHNIQUE:  AP, lateral, and oblique views of the right elbow were performed.    COMPARISON:  None    FINDINGS:  No acute abnormality or significant arthritic change.  Impression: As above    Electronically signed by: Nathaniel Sommer MD  Date:    10/09/2023  Time:    10:41      Relevant imaging results were reviewed and interpreted by me and per my read as above.  This was discussed with the patient and / or family today.     Assessment:  Anahy Mata is a 43 y.o. female presents today for right lateral elbow pain consistent with common extensor tendinosis.  Review of previous imaging show mild tendinosis without any large tears.  Discussed PRP versus minimally invasive tendon debridement and discussed the pros and cons of each.  I do think she would benefit from TENJET to the right lateral elbow.  She would like to move forward with that we discussed the pre intra and postprocedural expectations.  Discussed brief immobilization and out of work for likely 3-4 weeks with no overuse heavy lifting or continuous typing.  She will reach out to her employer and discussed potential down time as she recovers from this.  She is interested in moving forward.  TENJET right lateral elbow.    Lateral epicondylitis of right elbow  -     Tenotomy elbow; Future           Anupam Smith MD    Disclaimer: This note was prepared using a voice recognition system and is likely to have sound alike errors within the text.

## 2024-02-26 ENCOUNTER — PATIENT MESSAGE (OUTPATIENT)
Dept: SPORTS MEDICINE | Facility: CLINIC | Age: 44
End: 2024-02-26
Payer: COMMERCIAL

## 2024-02-26 ENCOUNTER — TELEPHONE (OUTPATIENT)
Dept: SPORTS MEDICINE | Facility: CLINIC | Age: 44
End: 2024-02-26
Payer: COMMERCIAL

## 2024-02-26 NOTE — TELEPHONE ENCOUNTER
----- Message from Skye Jay sent at 2/26/2024 10:51 AM CST -----  Contact: Corin/ Staci Coombs is calling to speak to the nurse regarding the attending physician form if it was received, please give her a call at 1 [phone]    Thanks  LJ

## 2024-02-29 ENCOUNTER — PATIENT MESSAGE (OUTPATIENT)
Dept: SPORTS MEDICINE | Facility: CLINIC | Age: 44
End: 2024-02-29
Payer: COMMERCIAL

## 2024-03-04 ENCOUNTER — TELEPHONE (OUTPATIENT)
Dept: SPORTS MEDICINE | Facility: CLINIC | Age: 44
End: 2024-03-04
Payer: COMMERCIAL

## 2024-03-04 NOTE — TELEPHONE ENCOUNTER
----- Message from Lashonda Lemus sent at 2/28/2024  3:52 PM CST -----  Contact: Corin/ Danial Coombs is calling to check the status of pt LA or if it was received. Corin can be reached at [phone] or fax [facility] to [phone].  Ref number [id number]        Thanks  CASSANDRA

## 2024-03-05 ENCOUNTER — PATIENT MESSAGE (OUTPATIENT)
Dept: SPORTS MEDICINE | Facility: CLINIC | Age: 44
End: 2024-03-05
Payer: COMMERCIAL

## 2024-03-07 ENCOUNTER — DOCUMENTATION ONLY (OUTPATIENT)
Dept: RESEARCH | Facility: HOSPITAL | Age: 44
End: 2024-03-07
Payer: COMMERCIAL

## 2024-03-07 NOTE — PROGRESS NOTES
Protocol: Tenjet Hydrocision 2022.190  PI: Anupam Smith MD    Spoke with patient about above protocol. I went over the consent form via telephone and all questions were answered to patient satisfaction. Patient is interested in consenting for project so she will arrive to clinic early to go over consent form in more detail. Patient will be seen prior to appointment with Dr. Smith on 3/8/2024.

## 2024-03-08 ENCOUNTER — RESEARCH ENCOUNTER (OUTPATIENT)
Dept: RESEARCH | Facility: HOSPITAL | Age: 44
End: 2024-03-08
Payer: COMMERCIAL

## 2024-03-08 ENCOUNTER — PROCEDURE VISIT (OUTPATIENT)
Dept: SPORTS MEDICINE | Facility: CLINIC | Age: 44
End: 2024-03-08
Payer: COMMERCIAL

## 2024-03-08 VITALS — HEIGHT: 63 IN | WEIGHT: 132 LBS | BODY MASS INDEX: 23.39 KG/M2

## 2024-03-08 DIAGNOSIS — M77.11 LATERAL EPICONDYLITIS OF RIGHT ELBOW: ICD-10-CM

## 2024-03-08 PROCEDURE — 76942 ECHO GUIDE FOR BIOPSY: CPT | Mod: S$GLB,,, | Performed by: STUDENT IN AN ORGANIZED HEALTH CARE EDUCATION/TRAINING PROGRAM

## 2024-03-08 PROCEDURE — 99499 UNLISTED E&M SERVICE: CPT | Mod: S$GLB,,, | Performed by: STUDENT IN AN ORGANIZED HEALTH CARE EDUCATION/TRAINING PROGRAM

## 2024-03-08 PROCEDURE — 24357 REPAIR ELBOW PERC: CPT | Mod: RT,S$GLB,, | Performed by: STUDENT IN AN ORGANIZED HEALTH CARE EDUCATION/TRAINING PROGRAM

## 2024-03-08 RX ORDER — TRAMADOL HYDROCHLORIDE 50 MG/1
50 TABLET ORAL EVERY 12 HOURS PRN
Qty: 14 EACH | Refills: 0 | Status: SHIPPED | OUTPATIENT
Start: 2024-03-08

## 2024-03-08 NOTE — PATIENT INSTRUCTIONS
Assessment:  Anahy Mata is a 44 y.o. female   Chief Complaint   Patient presents with    Right Elbow - Pain    Procedure     Tenjet R Elbow       Encounter Diagnosis   Name Primary?    Lateral epicondylitis of right elbow         Plan:  Performed percutaneous tenotomy (TENJET) procedure in office today.   Provided proper education regarding in-procedure expectations and post-procedure expectations.   At least 15 minutes were spent sizing, fitting, and educating regarding durable medical equipment today and all questions answered. This service was performed by Elvira West Sports Medicine Assistant under direction of Anupam Smith MD today.  CPT 32000.  At least 15 minutes were spent developing, teaching, and performing a home exercise program.  A written summary was provided and all questions were answered. This service was performed by Elvira West Sports Medicine Assistant under direction of Anupam Smith MD. CPT 97994-DD  An ambulatory referral to therapy was placed within the [facility] system today. You should expect a phone call within the next few days from Centralized Scheduling. If you do not hear lfrom them, please reach out to the PT department directly at [phone].        TENJET POST-PROCEDURE INSTRUCTIONS        1. WOUND CARE   - Keep bandages and procedure area clean and dry for 5 days   - Avoid submerging area in water for 5 days   - You did not have any sutures or stitches placed. Steri-strips were placed over the wound. These will fall off in the shower after about one week. If they have not fallen off after the first 9 days, you can remove them yourself.        CONTACT OUR OFFICE IF:     The area become red or hot to touch     You have a fever of 100° or more (but do not wait for a response, seek immediate care)     You have increased pain or swelling     You have drainage from the treatment site     Best way to connect would be via [employer] or call [facility] at [phone]. If unable  to connect, please proceed to the nearest Emergency Care Center.       2. POST-PROCEDURE PAIN CONTROL   - You may apply heat for 20 minutes as needed for discomfort   - Pain control:  Tylenol (acetaminophen), hot pack application, Tramadol as needed   - Please refrain from using anti-inflammatory medication as this can react negatively with the goal of the procedure.        3. WEEK BY WEEK SCHEDULE     Weeks 1-2     Immobilization:  brace (only for one week)     Lifting restriction: Typically, no more then 2-3 lbs (can of peas) for the first 2-3 weeks     Activity/work limitations: No overuse/repetitive activity     Week 3-5     Therapy: Start at the beginning of week 3     Immobilization: none     Return to work/activity: Per guidance of Physical therapist     Week 6-8     Follow-up in the office with Dr. Smith     Week 7-12    Progress with your training or return to work     Typically, full results are noted at 3 months and beyond     Week 12     Follow-up with Dr. Smith           Follow-up: 6 weeks or sooner if there are any problems between now and then.    Thank you for choosing [facility] and Dr. Anupam Smith for your orthopedic & sports medicine care. It is our goal to provide you with exceptional care that will help keep you healthy, active, and get you back in the game.    Please do not hesitate to reach out to us via email, phone, or MyChart with any questions, concerns, or feedback.    If you felt that you received exemplary care today, please consider leaving us feedback on Health[employer] at:  [email or web address]    If you are experiencing pain/discomfort ,or have questions after 5pm and would like to be connected to the [facility] on-call team, please call this number and specify which Sports Medicine provider is treating you: [phone]

## 2024-03-08 NOTE — PROGRESS NOTES
PROTOCOL: [place] HydroCision  SUBJECT  NUMBER: [id number]       Visit: Screening  Investigator: Dr. Anupam Smith     Informed Consent:      Consenting was completed in private area using the most current IRB approved version of the main Informed Consent Form (ICF) by myself. The patient was given ample time to review the consents prior to execution of the main ICF.     Prior to the ICF being signed, or any study protocol required data collection, testing, procedure, or intervention being performed, the following was done and/or discussed:?   Patient was given a copy of the ICF for review: yes?   Purpose of the study and qualifications to participate: yes???   Study design, follow up schedule, and tests or procedures done at each visit: yes??   Confidentiality and HIPAA Authorization for Release of Medical Records for the research trial/ subject's rights/research related injury: yes??   Risk, Benefits, Alternative Treatments, Compensation and Costs: yes??   Participation in the research trial is voluntary and patient may withdraw at anytime: yes??   Contact information for study related questions: yes??      Patient verbalizes understanding of the above: : yes?   Contact information for CRC and PI given to patient: : yes?   Patient able to adequately summarize: the purpose of the study, the risks associated with the study, and all procedures, testing, and follow-ups associated with the study: : yes?      Anahy Mata signed the IRB approved version of the main ICF.? All pages of the consents were reviewed with the patient, and all questions answered satisfactorily. The patient signed the paper consent form.      Patient received a fully executed copy of same (copy of informed consent made and provided to patient). The original consents were scanned into electronic medical records (EPIC).     Time of Consent Execution: 7:36am     Screening was registered in [employer].     Physician assessments completed? Yes     Patient  Assessments completed? Yes                Concomitant medications and medical history listed in the patient's electronic record were reviewed with the patient to ensure accuracy.      Tenjet procedure was conducted same day as screening. Procedure was done on right lateral elbow (lateral epicondylitis). Please see Dr. Smith's note for procedure details.      End of Visit:     Patient was instructed that she would be seen by the research department at her 2 week follow up visit post-surgery. Patient verbalized understanding and has all of our contact numbers should she need to get in touch with us before that time.

## 2024-03-08 NOTE — PROCEDURES
TENJET Operative Note:    PATIENT NAME: Anahy Mata    MEDICAL RECORD NUMBER: [id number]    AGE: 44 y.o.    INFORMED CONSENT: I verify that I personally obtained Anahy Mata's consent which was signed and uploaded into [employer].     TIMEOUT: Audible timeout was performed at 8:14am.   At this time, the team confirmed the correct patient, correct procedure and correct site with laterality. The patient's allergies were reviewed. The procedure site was marked. The procedure team checked for proper functioning of devices and supplies to be used for the procedure. The procedure team reviewed the relevant diagnostic tests pertinent to the procedure.  Confirmed by SMA Elvira West.    PHYSICIAN: Anupam Smith    LOCATION: [facility].      PROCEDURE: TenJet Tenotomy procedure for right common extensor tendinosis    ANESTHESIA: local    PREOPERATIVE DIAGNOSIS:  Right common extensor tendinosis    POSTOPERATIVE DIAGNOSIS:  Right common extensor tendinosis    PROCEDURE DESCRIPTION:    The treatment area was cleaned and draped in sterile fashion. Using sterile technique, a [employer] ultrasound laptop unit with a variable frequency (13.0-6.0 MHz) linear transducer was used to successfully localize the area of pathology to be treated today. A j[place] with a sterile marker was placed, on the skin, at the area of maximum tenderness. Then the treatment area was cleaned and draped in sterile fashion. A 22 gauge needle was visualized under ultrasound administering anesthetic lidocaine, to locally anesthetize the treatment area. A separate 25 gauge needle was then utilized to create a skin wheel using 1% lidocaine with epinephrine 1.5cm from the treatment area. An 11 gauge scalpel was used to make a small puncture incision in the area of the skin wheel, and ultrasound was utilized to visualize the scalpel at the target area. The 3 inch tenotomy needle was inserted into the  pathologic tissue under direct ultrasound guidance, and tenotomy was performed with degenerative tendinotic tissue removed. Upon completion, gentle compression was applied to the site with sterile gauze. Adhesive strips, occlusive dressing, and compression bandage were then applied.    Patient left the procedure room in satisfactory condition having tolerated the procedure well.    Volume:  1 L    ESTIMATED BLOOD LOSS: <5 ml    COMPLICATIONS: none    POSTOPERATIVE PLAN:   As indicated in the AVS given to the patient today post procedure.      Patient voiced understanding of these instructions.    Anupam Smith

## 2024-03-12 ENCOUNTER — TELEPHONE (OUTPATIENT)
Dept: SPORTS MEDICINE | Facility: CLINIC | Age: 44
End: 2024-03-12
Payer: COMMERCIAL

## 2024-03-13 ENCOUNTER — PATIENT MESSAGE (OUTPATIENT)
Dept: SPORTS MEDICINE | Facility: CLINIC | Age: 44
End: 2024-03-13
Payer: COMMERCIAL

## 2024-03-25 ENCOUNTER — OFFICE VISIT (OUTPATIENT)
Dept: SPORTS MEDICINE | Facility: CLINIC | Age: 44
End: 2024-03-25
Payer: COMMERCIAL

## 2024-03-25 ENCOUNTER — CLINICAL SUPPORT (OUTPATIENT)
Dept: REHABILITATION | Facility: HOSPITAL | Age: 44
End: 2024-03-25
Attending: STUDENT IN AN ORGANIZED HEALTH CARE EDUCATION/TRAINING PROGRAM
Payer: COMMERCIAL

## 2024-03-25 VITALS — BODY MASS INDEX: 23.4 KG/M2 | WEIGHT: 132.06 LBS | HEIGHT: 63 IN

## 2024-03-25 DIAGNOSIS — M25.521 CHRONIC ELBOW PAIN, RIGHT: Primary | ICD-10-CM

## 2024-03-25 DIAGNOSIS — M77.11 LATERAL EPICONDYLITIS OF RIGHT ELBOW: ICD-10-CM

## 2024-03-25 DIAGNOSIS — M25.621 DECREASED ROM OF RIGHT ELBOW: ICD-10-CM

## 2024-03-25 DIAGNOSIS — R29.898 DECREASED GRIP STRENGTH OF RIGHT HAND: ICD-10-CM

## 2024-03-25 DIAGNOSIS — M77.11 LATERAL EPICONDYLITIS OF RIGHT ELBOW: Primary | ICD-10-CM

## 2024-03-25 DIAGNOSIS — G89.29 CHRONIC ELBOW PAIN, RIGHT: Primary | ICD-10-CM

## 2024-03-25 DIAGNOSIS — M25.631 DECREASED RANGE OF MOTION OF RIGHT WRIST: ICD-10-CM

## 2024-03-25 PROCEDURE — 99499 UNLISTED E&M SERVICE: CPT | Mod: S$GLB,,, | Performed by: STUDENT IN AN ORGANIZED HEALTH CARE EDUCATION/TRAINING PROGRAM

## 2024-03-25 PROCEDURE — 97110 THERAPEUTIC EXERCISES: CPT | Mod: PN

## 2024-03-25 PROCEDURE — 99999 PR PBB SHADOW E&M-EST. PATIENT-LVL III: CPT | Mod: PBBFAC,,, | Performed by: STUDENT IN AN ORGANIZED HEALTH CARE EDUCATION/TRAINING PROGRAM

## 2024-03-25 PROCEDURE — 97165 OT EVAL LOW COMPLEX 30 MIN: CPT | Mod: PN

## 2024-03-25 NOTE — PLAN OF CARE
[facility]  Occupational Therapy Initial Evaluation     Date: 3/25/2024  Name: Anahy Mata  Clinic Number: [id number]    Therapy Diagnosis:   Encounter Diagnoses   Name Primary?    Lateral epicondylitis of right elbow     Chronic elbow pain, right Yes    Decreased  strength of right hand     Decreased ROM of right elbow     Decreased range of motion of right wrist      Physician: Anupam Smith MD    Physician Orders: OT eval and tx  Medical Diagnosis: Lateral epicondylitis of right elbow [M77.11]   Evaluation Date: 3/25/2024  Insurance Authorization Period Expiration: 12/31/2024  Plan of Care Certification Period: 3/25/2024 to 5/3/2024    Visit # / Visits authorized: 1/1  FOTO: 1/3    Surgical Procedure: right elbow tenotomy  Date of Surgery: 3/8/2024 - pt completed 2 weeks post-op on 3/22/2024     Date of Return to MD: pt saw MD this am prior to OT evaluation; to return PRN    Precautions:  Standard    Time In: 1050  Time Out: 1135  Total Appointment Time (timed & untimed codes): 45 minutes    Subjective     Involved Side: right  Dominant Side: Right    Date of Onset: 9-10 months  Mechanism of Injury/ History of Current Condition: Pt reports undergoing TenJet procedure for her right elbow on 3/8/2024. She reports being compliant with HEP since procedure and has been off work since. She reports mild, post-op achy pain and soreness but not the same achy/heavy/throbbing pain she had before the procedure. She reports every day is getting better. She reports her elbow is tight and she has difficulty straightening it out.    Interval hx from OT evaluation on 1/25/2024: Pt reports right elbow pain for past 7-8 months. She reports that she and her  like to hunt and they were pulling boards off a trailer one day, and she woke up the next day with pain that never went away. She reports that she's tried anti-inflammatories, ice, and heat, but nothing is really helping. She  "reports she feels like she needs to pop her LF all the time and that she is starting to get some pins/needles in her RF and SF. She reports that her right  is weaker and she'll drop stuff.     Falls: no    Per MD Notes on 3/25/2024: Anahy Mata is a 44-year-old female presenting today for follow-up right lateral elbow pain status post TENJET 2 weeks ago.  Pain minimal and overall resolving since the procedure.  Does have some pain at end range extension as well as with supination.  Has been compliant with home exercises and postprocedural wrist bracing.  Is due to start physical therapy today with Rad[facility] at [facility]. Right elbow exam loss of extension by about 5° compared to contralateral. Full pronation supination mild tenderness over the postsurgical site well healed at this time. No pain over the radial head. Motor function intact distally normal motor function of median ulnar radial nerve. Sensation intact. 2+ radial pulse. Some pain with resisted wrist extension no pain with resisted 3rd digit extension some pain with resisted supination no pain with maximal flexion of the wrist. Doing well pain well managed at this time, start therapy this week, discussed continued time off of work as she continues to recover. Discussed importance of full extension of the elbow as well as scapular control instability as she continues to progress with elbow function. Follow-up in 4 weeks.     Imaging: none new since procedure    Previous Therapy: yes, pt participated in occupational therapy for conservative tx of lateral epicondylitis prior to TenJet procedure Jan-Feb 2024    Patient's Goals for Therapy: "get my elbow back to normal"    Pain:  Functional Pain Scale Rating 0-10:   2/10 on average  n/a/10 at best  3/10 at worst  Location: right elbow  Description: sore  Aggravating Factors: movement/overuse  Easing Factors: heating pad and occasional Tylenol    Functional Limitations/Social History:    Previous " Functional Status: Independent with all ADL/IADL tasks      Current Functional Status: right arm pain limits ability to carry, use, or hold anything with ADL/IADL tasks     Home/Living environment: lives with her spouse                Driving: yes     Leisure: Hunting     Occupation:   Working presently: employed (but has been off work since procedure and will resume being off work for next 4 weeks)  Duties: working at a computer        Past Medical History/Physical Systems Review:   Medical History:   No past medical history on file.    Surgical History:    has a past surgical history that includes Tubal ligation; Cholecystectomy; and Nissen fundoplication (2005).    Medications:   has a current medication list which includes the following prescription(s): meloxicam, nitrofurantoin (macrocrystal-monohydrate), and tramadol.    Allergies:   Review of patient's allergies indicates:  No Known Allergies       Objective     Observation/Inspection: Pt reports mild TTP at elbow (at incision site) - well healed; no edema; pt exhibiting right compensatory shoulder movements with elbow extension ROM    Sensation: Pt denies paresthesias. Pt denies hypersensitivity. Intact to light touch.    Scar: Minimal tenderness to palpation. Scar is not thickened and raised, with no adherence.      bilateral Upper Extremity Active Range of Motion:     Right Left   ROM (in degrees) 3/25/2024 3/25/2024   Elbow flexion 146 142   Elbow extension -22 0   Pronation WNL WNL   Supination WNL WNL   Radial deviation 14 15   Ulnar deviation 30 34   Wrist flexion 68 74   Wrist extension 73 75       right Hand Active Range of Motion: WNL as compared to left upper extremity    Stress position: (elbow extended, wrist flexed)    Left: 72*   Right: 48*    Stress Position  Strength (in pounds):  Setting II   Left Right   Elbow Flexed - Point of pain 58 10   Elbow Flexed - Max  58 18   Elbow Extended Pronated Point of pain 55 7    Elbow Extended Pronated Max  55 10       Intake Outcome Measure for FOTO Elbow Survey    Therapist reviewed FOTO scores for Anahy Mata on 3/25/2024.   FOTO documents entered into [facility] - see Media section.    Intake Score: 49%     Predicted Functional Score: 67% in 13 visits    Treatment     Total Treatment time (time-based codes) separate from Evaluation: 15 minutes    Anahy received the treatments listed below:      therapeutic exercises to develop ROM and flexibility for 15 minutes including:  - HEP    Home Exercise Program/Education:    Education provided:   - Role of OT, goals for OT, scheduling/cancellations, therapy attendance policy    Written Home Exercises Provided: Yes  Exercises were reviewed and Anahy was able to demonstrate them prior to the end of the session. Anahy demonstrated good understanding of the education provided. See EMR under Patient Instructions for exercises provided during therapy sessions.     Pt was advised to perform these exercises free of pain, and to stop performing them if pain occurs.    Assessment     Anahy Mata is a 44 y.o. female referred to outpatient occupational therapy and presents with a medical diagnosis of Lateral epicondylitis of right elbow [M77.11]. She is s/p TenJet procedure on 3/8/2024. She presents with the following therapy deficits: Decreased ROM, Decreased  strength, Decreased muscle strength, Decreased functional hand use, Increased pain, Joint Stiffness, and Diminished/Impaired Coordination and demonstrates limitations as described in the chart below.     Following medical record review, it is determined that the pt will benefit from occupational therapy services in order to maximize pain free and/or functional use of her right UE. The following goals were discussed with the patient and patient is in agreement with them as to be addressed in the treatment plan. The patient's rehab potential is Good.     Anticipated barriers to  occupational therapy: none  Pt has no cultural, educational or language barriers to learning provided.    Medical Necessity is demonstrated by the following  Occupational Profile/History  Co-morbidities and personal factors that may impact the plan of care [x] LOW: Brief chart review  [] MODERATE: Expanded chart review   [] HIGH: Extensive chart review    Moderate / High Support Documentation: n/a     Examination  Performance deficits relating to physical, cognitive or psychosocial skills that result in activity limitations and/or participation restrictions  [] LOW: addressing 1-3 Performance deficits  [x] MODERATE: 3-5 Performance deficits  [] HIGH: 5+ Performance deficits (please support below)    Moderate / High Support Documentation:    Physical:  Joint Mobility  Muscle Power/Strength  Muscle Endurance   Strength  Gross Motor Coordination  Proprioception Functions  Postural Control  Pain    Cognitive:  No Deficits    Psychosocial:    No Deficits     Treatment Options [x] LOW: Limited options  [] MODERATE: Several options  [] HIGH: Multiple options      Decision Making/ Complexity Score: low       The following goals were discussed with the patient and patient is in agreement with them as to be addressed in the treatment plan.     Goals:   Short Term Goals: (4 weeks)  1. Pt will be independent with HEP in 2 visits.  2. Pt will report decreased pain to a 5-6/10 with ADL/IADL tasks.  3. Pt will progress to right extrinsic stretches with elbow extended in 5 visits.    4. Pt will increase right stress position wrist flexion to 55 degrees.   5. Pt will increase right point of pain  testing with elbow extended to 20#.   6. Pt will report an increase in FOTO intake score of > 55%, which would indicate an improvement in quality of life.    Long Term Goals: (6 weeks)  1. Pt will report decreased right elbow pain to 0-1/10 with ADL/IADL tasks.   2. Pt will tolerate right full elbow extension with pronation to  retrieve objects from workspace and shelving without pain.   3. Pt will report an increase in FOTO intake score of > 65%, which would indicate an improvement in quality of life.  4. Pt will exhibit right elbow ROM that is WNL as compared to left elbow.  5. Pt will increase right max elbow extended  to 40-50#.  6. Pt will return to PLOF, including typing at work and hunting.      Plan   Plan of Care Certification: 3/25/2024 to 5/3/2024     Outpatient Occupational Therapy 2 times weekly for 6 weeks to include the following interventions PRN: Manual Therapy, Moist Heat/ Ice, Neuromuscular Re-ed, Orthotic Management and Training, Patient Education, Self Care, Therapeutic Activities, Therapeutic Exercise, and Ultrasound      JUAN FRANCISCO MAYES OT

## 2024-03-25 NOTE — PATIENT INSTRUCTIONS
Assessment:  Anahy Mata is a 44 y.o. female   Chief Complaint   Patient presents with    Right Elbow - Post-op Evaluation       Encounter Diagnosis   Name Primary?    Lateral epicondylitis of right elbow Yes        Plan:  Continue therapy per protocol. Continue home exercises  Doing well, work on achieving full elbow extension     Follow-up: 6 weeks after TENJET or sooner if there are any problems between now and then.    Thank you for choosing [facility] and Dr. Anupam Smith for your orthopedic & sports medicine care. It is our goal to provide you with exceptional care that will help keep you healthy, active, and get you back in the game.    Please do not hesitate to reach out to us via email, phone, or MyChart with any questions, concerns, or feedback.    If you felt that you received exemplary care today, please consider leaving us feedback on Healthgrades at:  [email or web address]    If you are experiencing pain/discomfort ,or have questions after 5pm and would like to be connected to the [facility] on-call team, please call this number and specify which Sports Medicine provider is treating you: [phone]

## 2024-03-25 NOTE — PROGRESS NOTES
Patient ID: Anahy Mata  YOB: 1980  MRN: [id number]    Chief Complaint: Post-op Evaluation of the Right Elbow      History of Present Illness: Anahy Mata is a 44-year-old female presenting today for follow-up right lateral elbow pain status post TENJET 2 weeks ago.  Pain minimal and overall resolving since the procedure.  Does have some pain at end range extension as well as with supination.  Has been compliant with home exercises and postprocedural wrist bracing.  Is due to start physical therapy today with Radha [facility].    Past Medical History:   History reviewed. No pertinent past medical history.  Past Surgical History:   Procedure Laterality Date    CHOLECYSTECTOMY      NISSEN FUNDOPLICATION  2005    TUBAL LIGATION       Family History   Problem Relation Age of Onset    Hypertension Mother     No Known Problems Father      Social History     Socioeconomic History    Marital status:    Tobacco Use    Smoking status: Former     Current packs/day: 0.00     Types: Cigarettes     Quit date: 2011     Years since quittin.7    Smokeless tobacco: Never   Substance and Sexual Activity    Alcohol use: No    Drug use: No    Sexual activity: Yes     Partners: Male     Medication List with Changes/Refills   Current Medications    MELOXICAM (MOBIC) 15 MG TABLET    Take 1 tablet (15 mg total) by mouth once daily.    NITROFURANTOIN, MACROCRYSTAL-MONOHYDRATE, (MACROBID) 100 MG CAPSULE    Take 1 capsule (100 mg total) by mouth 2 (two) times daily.    TRAMADOL (ULTRAM) 50 MG TABLET    Take 1 tablet (50 mg total) by mouth every 12 (twelve) hours as needed for Pain.     Review of patient's allergies indicates:  No Known Allergies    Physical Exam:   Body mass index is 23.39 kg/m².    GENERAL: Well appearing, in no acute distress.  HEAD: Normocephalic and atraumatic.  ENT: External ears and nose grossly normal.  EYES: EOMI bilaterally  PULMONARY: Respirations are grossly even and  non-labored.  NEURO: Awake, alert, and oriented x 3.  SKIN: No obvious rashes appreciated.  PSYCH: Mood & affect are appropriate.    Detailed MSK exam:     Right elbow exam loss of extension by about 5° compared to contralateral.  Full pronation supination mild tenderness over the postsurgical site well healed at this time.  No pain over the radial head.  Motor function intact distally normal motor function of median ulnar radial nerve.  Sensation intact.  2+ radial pulse.  Some pain with resisted wrist extension no pain with resisted 3rd digit extension some pain with resisted supination no pain with maximal flexion of the wrist.    Imaging:  X-Ray Elbow Complete Right  Narrative: EXAMINATION:  XR ELBOW COMPLETE 3 VIEW RIGHT    CLINICAL HISTORY:  . Pain in right elbow    TECHNIQUE:  AP, lateral, and oblique views of the right elbow were performed.    COMPARISON:  None    FINDINGS:  No acute abnormality or significant arthritic change.  Impression: As above    Electronically signed by: Nathaniel Sommer MD  Date:    10/09/2023  Time:    10:41    Relevant imaging results were reviewed and interpreted by me and per my read as above.  This was discussed with the patient and / or family today.     Assessment:  Anahy Mata is a 44 y.o. female presents today for follow-up for right lateral elbow pain 2 weeks status post TENJET common extensor tendon.  Doing well pain well managed at this time, start therapy this week, discussed continued time off of work as she continues to recover.  Discussed importance of full extension of the elbow as well as scapular control instability as she continues to progress with elbow function.  Follow-up in 4 weeks.    Lateral epicondylitis of right elbow           Anupam Smith MD    Disclaimer: This note was prepared using a voice recognition system and is likely to have sound alike errors within the text.

## 2024-03-26 ENCOUNTER — PATIENT MESSAGE (OUTPATIENT)
Dept: SPORTS MEDICINE | Facility: CLINIC | Age: 44
End: 2024-03-26
Payer: COMMERCIAL

## 2024-03-27 ENCOUNTER — CLINICAL SUPPORT (OUTPATIENT)
Dept: REHABILITATION | Facility: HOSPITAL | Age: 44
End: 2024-03-27
Payer: COMMERCIAL

## 2024-03-27 DIAGNOSIS — M25.621 DECREASED ROM OF RIGHT ELBOW: ICD-10-CM

## 2024-03-27 DIAGNOSIS — R29.898 DECREASED GRIP STRENGTH OF RIGHT HAND: ICD-10-CM

## 2024-03-27 DIAGNOSIS — G89.29 CHRONIC ELBOW PAIN, RIGHT: Primary | ICD-10-CM

## 2024-03-27 DIAGNOSIS — M25.521 CHRONIC ELBOW PAIN, RIGHT: Primary | ICD-10-CM

## 2024-03-27 DIAGNOSIS — M25.631 DECREASED RANGE OF MOTION OF RIGHT WRIST: ICD-10-CM

## 2024-03-27 PROCEDURE — 97110 THERAPEUTIC EXERCISES: CPT | Mod: PN

## 2024-03-27 NOTE — PROGRESS NOTES
"    [facility]  Occupational Therapy Treatment Note     Date: 3/27/2024  Name: Anahy Mata  Clinic Number: [id number]    Therapy Diagnosis:   Encounter Diagnoses   Name Primary?    Chronic elbow pain, right Yes    Decreased  strength of right hand     Decreased ROM of right elbow     Decreased range of motion of right wrist      Physician: Anupam Smith MD    Physician Orders: OT eval and tx  Medical Diagnosis: Lateral epicondylitis of right elbow [M77.11]   Evaluation Date: 3/25/2024  Insurance Authorization Period Expiration: 12/31/2024  Plan of Care Certification Period: 3/25/2024 to 5/3/2024     Visit # / Visits authorized: 1/30  FOTO: 1/3     Surgical Procedure: right elbow tenotomy  Date of Surgery: 3/8/2024 - pt completed 2 weeks post-op on 3/22/2024                            Date of Return to MD: PRN     Precautions:  Standard    Time In: 0900  Time Out: 0950  Total Billable Time: 40 minutes    Subjective     Pt reports: "I'm a little sore but I've been doing my exercises."    she was compliant with home exercise program given on evaluation.  Response to previous treatment: good  Functional change: improved right elbow and wrist ROM    Pain: 2/10 (2/10 on evaluation)  Location: right elbow    Objective   Objective measures updated at progress report unless specified.    Treatment     Anahy received the treatments listed below:      therapeutic exercises to develop strength and ROM for 40 minutes including:  - reviewed HEP   - elbow flex/ext AROM x 20 reps - with and without 2# DB  - forearm pron/sup AROM x 20 reps - with and without 2# DB  - wrist flex/ext x 20 reps  - bilateral scap retraction AROM 3x10  - overhead reaching AROM x 20 reps  - wrist flexor and extensor stretches x 30 sec holds each, 2 rounds - progressing toward an extended elbow  - wrist flexor and extensor isometrics x 20 sec holds each, 2 rounds  - wrist AROM with 1# DB, forearm 3 positions " over bolster, x 20 reps each  - wrist extension eccentrics with 1# DB over bolster 3x10  - composite  strengthening with blue digiciser 5x10    manual therapy techniques were applied to RUE for 0 minutes including:  - NT    neuromuscular re-education activities to improve Coordination and Proprioception for 0 minutes including:  - NT    therapeutic activities to improve functional performance for 0 minutes including:  - NT    direct contact modalities after being cleared for contraindications for 0 minutes including:  - NT    supervised modalities after being cleared for contradictions for 10 minutes including:  - cold/ice pack to right elbow at end of session     Home Exercises and Education Provided     Education provided:   - reviewed HEP issued at evaluation  - progress toward goals    Written Home Exercises Provided: Patient instructed to cont prior HEP  Exercises were reviewed and Anahy was able to demonstrate them prior to the end of the session. Anahy demonstrated good understanding of the HEP provided.     See EMR under Patient Instructions for exercises provided during prior visit.        Assessment     Anahy was seen for her first tx session on this date. She has been compliant with her HEP and exhibited improved right elbow and wrist ROM. Introduced PRE's and she tolerated well.    Anahy is progressing towards her goals and there are no updates to goals at this time. Pt prognosis is Good.     Anahy will continue to benefit from skilled outpatient occupational therapy to address the deficits listed in the problem list on initial evaluation provide pt/family education and to maximize pt's level of independence in the home and community environment.     Pt's spiritual, cultural and educational needs considered and pt agreeable to plan of care and goals.    Anticipated barriers to occupational therapy: none    Goals:  Short Term Goals: (4 weeks)  1. Pt will be independent with HEP in 2 visits.  - MET 3/27/2024  2. Pt will report decreased pain to a 5-6/10 with ADL/IADL tasks.  3. Pt will progress to right extrinsic stretches with elbow extended in 5 visits.    4. Pt will increase right stress position wrist flexion to 55 degrees.   5. Pt will increase right point of pain  testing with elbow extended to 20#.   6. Pt will report an increase in FOTO intake score of > 55%, which would indicate an improvement in quality of life.     Long Term Goals: (6 weeks)  1. Pt will report decreased right elbow pain to 0-1/10 with ADL/IADL tasks.   2. Pt will tolerate right full elbow extension with pronation to retrieve objects from workspace and shelving without pain.   3. Pt will report an increase in FOTO intake score of > 65%, which would indicate an improvement in quality of life.  4. Pt will exhibit right elbow ROM that is WNL as compared to left elbow.  5. Pt will increase right max elbow extended  to 40-50#.  6. Pt will return to PLOF, including typing at work and hunting.    Plan     Plan of Care Certification: 3/25/2024 to 5/3/2024      Outpatient Occupational Therapy 2 times weekly for 6 weeks to include the following interventions PRN: Manual Therapy, Moist Heat/ Ice, Neuromuscular Re-ed, Orthotic Management and Training, Patient Education, Self Care, Therapeutic Activities, Therapeutic Exercise, and Ultrasound    Updates/Grading for next session: progress occupational therapy as tolerated    JUAN FRANCISCO MAYES, OT

## 2024-04-01 ENCOUNTER — CLINICAL SUPPORT (OUTPATIENT)
Dept: REHABILITATION | Facility: HOSPITAL | Age: 44
End: 2024-04-01
Payer: COMMERCIAL

## 2024-04-01 DIAGNOSIS — M25.631 DECREASED RANGE OF MOTION OF RIGHT WRIST: ICD-10-CM

## 2024-04-01 DIAGNOSIS — M25.521 CHRONIC ELBOW PAIN, RIGHT: Primary | ICD-10-CM

## 2024-04-01 DIAGNOSIS — R29.898 DECREASED GRIP STRENGTH OF RIGHT HAND: ICD-10-CM

## 2024-04-01 DIAGNOSIS — G89.29 CHRONIC ELBOW PAIN, RIGHT: Primary | ICD-10-CM

## 2024-04-01 DIAGNOSIS — M25.621 DECREASED ROM OF RIGHT ELBOW: ICD-10-CM

## 2024-04-01 PROCEDURE — 97110 THERAPEUTIC EXERCISES: CPT | Mod: PN

## 2024-04-01 NOTE — PROGRESS NOTES
"    [facility]  Occupational Therapy Treatment Note     Date: 4/1/2024  Name: Anahy Mata  Clinic Number: [id number]    Therapy Diagnosis:   Encounter Diagnoses   Name Primary?    Chronic elbow pain, right Yes    Decreased  strength of right hand     Decreased ROM of right elbow     Decreased range of motion of right wrist      Physician: Anupam Smith MD    Physician Orders: OT eval and tx  Medical Diagnosis: Lateral epicondylitis of right elbow [M77.11]   Evaluation Date: 3/25/2024  Insurance Authorization Period Expiration: 12/31/2024  Plan of Care Certification Period: 3/25/2024 to 5/3/2024     Visit # / Visits authorized: 2/30  FOTO: 1/3     Surgical Procedure: right elbow tenotomy  Date of Surgery: 3/8/2024 - pt completed 3 weeks post-op on 3/29/2024                            Date of Return to MD: 4/23/2024     Precautions:  Standard    Time In: 1030  Time Out: 1125  Total Billable Time: 55 minutes    Subjective     Pt reports: "I'm a little sore but I did a little fishing this past weekend for [employer]. We went to [employer] [employer]. My elbow is still tight but once I get it moving, it's better."    she was compliant with home exercise program given on evaluation.  Response to previous treatment: good  Functional change: able to fish    Pain: 2/10 - no pain, just tender and sore  Location: right elbow    Objective   Objective measures updated at progress report unless specified.    Treatment     Anahy received the treatments listed below:      therapeutic exercises to develop strength and ROM for 55 minutes including:  - elbow extension stretch over bolster (with simultaneous MHP)  - elbow flex/ext with 2# DB, forearm supinated and neutral, x 10 reps each  - elbow flex/ext with 1# DB, forearm pronated, x 10 reps   - forearm pron/sup AROM with 1# pronator wand x 20 reps  - bilateral scap retraction AROM 3x10  - tricep extensions/skull crushers with 1# DB 3x10  - wrist " flexor, extensor, and crossbody stretches x 30 sec holds each - with elbow extended  - wrist flexor and extensor isometrics x 20 sec holds each, 2 rounds  - wrist AROM with 2# DB, forearm 3 positions over bolster, x 20 reps each  - wrist extension eccentrics with 2# DB over bolster 3x10  - composite  strengthening with blue digiciser (with elbow bent and extended) x 20 reps each  - inclined push-ups against rig (5th hole) 3x10    manual therapy techniques were applied to RUE for 0 minutes including:  - NT    neuromuscular re-education activities to improve Coordination and Proprioception for 0 minutes including:  - NT    therapeutic activities to improve functional performance for 0 minutes including:  - NT    direct contact modalities after being cleared for contraindications for 0 minutes including:  - NT    supervised modalities after being cleared for contradictions for 8 minutes including:  - MHP to right elbow (during simultaneous elbow extension stretch over bolster)    Home Exercises and Education Provided     Education provided:   - progress toward goals    Written Home Exercises Provided: Patient instructed to cont prior HEP  Exercises were reviewed and Anahy was able to demonstrate them prior to the end of the session. Anahy demonstrated good understanding of the HEP provided.     See EMR under Patient Instructions for exercises provided during prior visit.        Assessment     Anahy tolerated session well today. She exhibited improved elbow extension with decreased shoulder compensatory strategies.    Anahy is progressing towards her goals and there are no updates to goals at this time. Pt prognosis is Good.     Anahy will continue to benefit from skilled outpatient occupational therapy to address the deficits listed in the problem list on initial evaluation provide pt/family education and to maximize pt's level of independence in the home and community environment.     Pt's spiritual,  cultural and educational needs considered and pt agreeable to plan of care and goals.    Anticipated barriers to occupational therapy: none    Goals:  Short Term Goals: (4 weeks)  1. Pt will be independent with HEP in 2 visits. - MET 3/27/2024  2. Pt will report decreased pain to a 5-6/10 with ADL/IADL tasks. - MET 4/1/2024  3. Pt will progress to right extrinsic stretches with elbow extended in 5 visits. - MET 4/1/2024    4. Pt will increase right stress position wrist flexion to 55 degrees.   5. Pt will increase right point of pain  testing with elbow extended to 20#.   6. Pt will report an increase in FOTO intake score of > 55%, which would indicate an improvement in quality of life.     Long Term Goals: (6 weeks)  1. Pt will report decreased right elbow pain to 0-1/10 with ADL/IADL tasks.   2. Pt will tolerate right full elbow extension with pronation to retrieve objects from workspace and shelving without pain.   3. Pt will report an increase in FOTO intake score of > 65%, which would indicate an improvement in quality of life.  4. Pt will exhibit right elbow ROM that is WNL as compared to left elbow.  5. Pt will increase right max elbow extended  to 40-50#.  6. Pt will return to PLOF, including typing at work and hunting.    Plan     Plan of Care Certification: 3/25/2024 to 5/3/2024      Outpatient Occupational Therapy 2 times weekly for 6 weeks to include the following interventions PRN: Manual Therapy, Moist Heat/ Ice, Neuromuscular Re-ed, Orthotic Management and Training, Patient Education, Self Care, Therapeutic Activities, Therapeutic Exercise, and Ultrasound    Updates/Grading for next session: progress occupational therapy as tolerated    JUAN FRANCISCO MAYES OT

## 2024-04-04 ENCOUNTER — CLINICAL SUPPORT (OUTPATIENT)
Dept: REHABILITATION | Facility: HOSPITAL | Age: 44
End: 2024-04-04
Payer: COMMERCIAL

## 2024-04-04 DIAGNOSIS — R29.898 DECREASED GRIP STRENGTH OF RIGHT HAND: ICD-10-CM

## 2024-04-04 DIAGNOSIS — M25.631 DECREASED RANGE OF MOTION OF RIGHT WRIST: ICD-10-CM

## 2024-04-04 DIAGNOSIS — M25.621 DECREASED ROM OF RIGHT ELBOW: ICD-10-CM

## 2024-04-04 DIAGNOSIS — M25.521 CHRONIC ELBOW PAIN, RIGHT: Primary | ICD-10-CM

## 2024-04-04 DIAGNOSIS — G89.29 CHRONIC ELBOW PAIN, RIGHT: Primary | ICD-10-CM

## 2024-04-04 PROCEDURE — 97035 APP MDLTY 1+ULTRASOUND EA 15: CPT | Mod: PN

## 2024-04-04 PROCEDURE — 97110 THERAPEUTIC EXERCISES: CPT | Mod: PN

## 2024-04-04 NOTE — PROGRESS NOTES
"  [facility]  Occupational Therapy Treatment Note     Date: 4/4/2024  Name: Anahy Mata  Clinic Number: [id number]    Therapy Diagnosis:   Encounter Diagnoses   Name Primary?    Chronic elbow pain, right Yes    Decreased  strength of right hand     Decreased ROM of right elbow     Decreased range of motion of right wrist        Physician: Anupam Smith MD    Physician Orders: OT eval and tx  Medical Diagnosis: Lateral epicondylitis of right elbow [M77.11]   Evaluation Date: 3/25/2024  Insurance Authorization Period Expiration: 12/31/2024  Plan of Care Certification Period: 3/25/2024 to 5/3/2024     Visit # / Visits authorized: 3/30  FOTO: 1/3     Surgical Procedure: right elbow tenotomy  Date of Surgery: 3/8/2024 - pt completed 3 weeks post-op on 3/29/2024                            Date of Return to MD: 4/23/2024     Precautions:  Standard    Time In: 1030  Time Out: 1130  Total Billable Time: 50 minutes    Subjective     Pt reports: "I had some soreness for a few days after my last session. I'm trying to figure out how it's hurting or if it's just soreness. This morning, it feels better. It's  if I bump it."    she was compliant with home exercise program given on evaluation.  Response to previous treatment: good  Functional change: used right hand to use [employer] at home    Pain: 2/10 - no pain, just tender and sore  Location: right elbow    Objective   Objective measures updated at progress report unless specified.    Treatment     Anahy received the treatments listed below:      therapeutic exercises to develop strength and ROM for 40 minutes including:  - wrist flexor, extensor, and crossbody stretches x 30 sec holds each - with elbow extended  - elbow extension stretch over bolster (with simultaneous MHP)  - elbow flex/ext with 3# DB, forearm 3 positions, x 10 reps each  - forearm pron/sup AROM (with elbow bent) with 1# pronator wand x 20 " reps  - forearm pron/sup AROM with 1# DB (with elbow extended) x 15 reps  - wrist flex/ext with 2# DB with FA in neutral with elbow flexed, 20x  - wrist flex/ext with 2# DB with FA pronated and elbow flexed, 20x  - wrist flex/ext with 2# DB with FA in neutral with elbow extended, 2x10  - wrist flex/ext with 2# DB with FA pronated and elbow extended, 2x10  - active fist with blue sponge with FA neutral and elbow flexed, 15x  - active fist with blue sponge with FA pronated and and elbow flexed, 15x  - active fist with blue sponge with FA neutral and elbow extended, 15x  - active fist with blue sponge with FA pronated and and elbow extended, 15x   - wrist flexor and extensor isometrics x 20 sec holds each, 3 rounds  - wrist extension eccentrics with 3# DB over bolster 3x10  - bilateral scap retraction AROM 3x10    manual therapy techniques were applied to RUE for 0 minutes including:  - NT    neuromuscular re-education activities to improve Coordination and Proprioception for 0 minutes including:  - NT    therapeutic activities to improve functional performance for 0 minutes including:  - NT    direct contact modalities after being cleared for contraindications for 8 minutes including:  - Ultrasound:  3 Mhz, 100% duty cycle, 0.8 w/cm2 for 8 minutes at common extensor bundle origin at lateral epicondylitis to decrease pain and improve circulation    supervised modalities after being cleared for contradictions for 8 minutes including:  - cold/ice pack to right lateral elbow at end of session    Home Exercises and Education Provided     Education provided:   - progress toward goals    Written Home Exercises Provided: Patient instructed to cont prior HEP  Exercises were reviewed and Anahy was able to demonstrate them prior to the end of the session. Anahy demonstrated good understanding of the HEP provided.     See EMR under Patient Instructions for exercises provided during prior visit.        Assessment     Anahy  tolerated PRE's well today and no longer describes her pain as achy, but simply soreness. She tolerated an extended elbow position during exercises as well.    Anahy is progressing towards her goals and there are no updates to goals at this time. Pt prognosis is Good.     Anahy will continue to benefit from skilled outpatient occupational therapy to address the deficits listed in the problem list on initial evaluation provide pt/family education and to maximize pt's level of independence in the home and community environment.     Pt's spiritual, cultural and educational needs considered and pt agreeable to plan of care and goals.    Anticipated barriers to occupational therapy: none    Goals:  Short Term Goals: (4 weeks)  1. Pt will be independent with HEP in 2 visits. - MET 3/27/2024  2. Pt will report decreased pain to a 5-6/10 with ADL/IADL tasks. - MET 4/1/2024  3. Pt will progress to right extrinsic stretches with elbow extended in 5 visits. - MET 4/1/2024    4. Pt will increase right stress position wrist flexion to 55 degrees.   5. Pt will increase right point of pain  testing with elbow extended to 20#.   6. Pt will report an increase in FOTO intake score of > 55%, which would indicate an improvement in quality of life.     Long Term Goals: (6 weeks)  1. Pt will report decreased right elbow pain to 0-1/10 with ADL/IADL tasks.   2. Pt will tolerate right full elbow extension with pronation to retrieve objects from workspace and shelving without pain.   3. Pt will report an increase in FOTO intake score of > 65%, which would indicate an improvement in quality of life.  4. Pt will exhibit right elbow ROM that is WNL as compared to left elbow.  5. Pt will increase right max elbow extended  to 40-50#.  6. Pt will return to PLOF, including typing at work and hunting.    Plan     Plan of Care Certification: 3/25/2024 to 5/3/2024      Outpatient Occupational Therapy 2 times weekly for 6 weeks to include  the following interventions PRN: Manual Therapy, Moist Heat/ Ice, Neuromuscular Re-ed, Orthotic Management and Training, Patient Education, Self Care, Therapeutic Activities, Therapeutic Exercise, and Ultrasound    Updates/Grading for next session: progress occupational therapy as tolerated    JUAN FRANCISCO MAYES OT

## 2024-04-10 ENCOUNTER — PATIENT MESSAGE (OUTPATIENT)
Dept: REHABILITATION | Facility: HOSPITAL | Age: 44
End: 2024-04-10
Payer: COMMERCIAL

## 2024-04-15 ENCOUNTER — CLINICAL SUPPORT (OUTPATIENT)
Dept: REHABILITATION | Facility: HOSPITAL | Age: 44
End: 2024-04-15
Payer: COMMERCIAL

## 2024-04-15 DIAGNOSIS — R29.898 DECREASED GRIP STRENGTH OF RIGHT HAND: ICD-10-CM

## 2024-04-15 DIAGNOSIS — M25.621 DECREASED ROM OF RIGHT ELBOW: ICD-10-CM

## 2024-04-15 DIAGNOSIS — G89.29 CHRONIC ELBOW PAIN, RIGHT: Primary | ICD-10-CM

## 2024-04-15 DIAGNOSIS — M25.521 CHRONIC ELBOW PAIN, RIGHT: Primary | ICD-10-CM

## 2024-04-15 DIAGNOSIS — M25.631 DECREASED RANGE OF MOTION OF RIGHT WRIST: ICD-10-CM

## 2024-04-15 PROCEDURE — 97035 APP MDLTY 1+ULTRASOUND EA 15: CPT | Mod: PN

## 2024-04-15 PROCEDURE — 97110 THERAPEUTIC EXERCISES: CPT | Mod: PN

## 2024-04-15 NOTE — PROGRESS NOTES
"  [facility]  Occupational Therapy Treatment Note     Date: 4/15/2024  Name: Anahy Mata  Clinic Number: [id number]    Therapy Diagnosis:   Encounter Diagnoses   Name Primary?    Chronic elbow pain, right Yes    Decreased  strength of right hand     Decreased ROM of right elbow     Decreased range of motion of right wrist      Physician: Anupam Smith MD    Physician Orders: OT eval and tx  Medical Diagnosis: Lateral epicondylitis of right elbow [M77.11]   Evaluation Date: 3/25/2024  Insurance Authorization Period Expiration: 12/31/2024  Plan of Care Certification Period: 3/25/2024 to 5/3/2024     Visit # / Visits authorized: 4/30  FOTO: 1/3     Surgical Procedure: right elbow tenotomy  Date of Surgery: 3/8/2024 - pt completed 5 weeks post-op on 4/12/2024                            Date of Return to MD: 4/23/2024     Precautions:  Standard    Time In: 1030  Time Out: 1120  Total Billable Time: 45 minutes    Subjective     Pt reports: "My elbow has been feeling pretty good. It hasn't been hurting. It's still a little tight."    she was compliant with home exercise program given on evaluation.  Response to previous treatment: good  Functional change: resolution of pain    Pain: 0/10  Location: right elbow    Objective   Objective measures updated at progress report unless specified.    Treatment     Anahy received the treatments listed below:      therapeutic exercises to develop strength and ROM for 35 minutes including:  - wrist flexor, extensor, and crossbody stretches x 30 sec holds each - with elbow fully extended  - elbow flex/ext with 3# DB, forearm 3 positions, x 10 reps each  - forearm pron/sup AROM (with elbow bent) with 1# pronator wand (hand 1/2 way up handle) x 20 reps  - forearm pron/sup AROM with 2# DB (with elbow extended) x 15 reps  - wrist flex/ext with 2# DB with FA neutral with elbow flexed, 20x  - wrist flex/ext with 2# DB with FA pronated and elbow " flexed, 20x  - wrist flex/ext with 2# DB with FA neutral with elbow extended, 20x  - wrist flex/ext with 2# DB with FA pronated and elbow extended, 20x  - active fist with green digiciser with FA neutral and elbow flexed, 20x  - active fist with green digiciser with FA pronated and and elbow flexed, 20x  - active fist with green digiciser with FA neutral and elbow extended, 20x  - active fist with green digiciser with FA pronated and and elbow extended, 20x   - wrist extension eccentrics with 4# DB over bolster 3x10  - bilateral scap retraction AROM 3x10    manual therapy techniques were applied to RUE for 0 minutes including:  - NT    neuromuscular re-education activities to improve Coordination and Proprioception for 0 minutes including:  - NT    therapeutic activities to improve functional performance for 0 minutes including:  - NT    direct contact modalities after being cleared for contraindications for 8 minutes including:  - Ultrasound:  3 Mhz, 100% duty cycle, 0.8 w/cm2 for 8 minutes at common extensor bundle origin at lateral epicondylitis to decrease pain and improve circulation    supervised modalities after being cleared for contradictions for 5 minutes including:  - cold/ice pack to right lateral elbow at end of session    Home Exercises and Education Provided     Education provided:   - progress toward goals    Written Home Exercises Provided: Patient instructed to cont prior HEP  Exercises were reviewed and Anahy was able to demonstrate them prior to the end of the session. Anahy demonstrated good understanding of the HEP provided.     See EMR under Patient Instructions for exercises provided during prior visit.        Assessment     Anahy returned to therapy today after not attending since 4/4/2024 2* having the flu. Despite this, she tolerated all stretches with a fully extended elbow and tolerated an increase in reps and resistance during all exercises. She is making good progress.    Anahy  is progressing towards her goals and there are no updates to goals at this time. Pt prognosis is Good.     Anahy will continue to benefit from skilled outpatient occupational therapy to address the deficits listed in the problem list on initial evaluation provide pt/family education and to maximize pt's level of independence in the home and community environment.     Pt's spiritual, cultural and educational needs considered and pt agreeable to plan of care and goals.    Anticipated barriers to occupational therapy: none    Goals:  Short Term Goals: (4 weeks)  1. Pt will be independent with HEP in 2 visits. - MET 3/27/2024  2. Pt will report decreased pain to a 5-6/10 with ADL/IADL tasks. - MET 4/1/2024  3. Pt will progress to right extrinsic stretches with elbow extended in 5 visits. - MET 4/1/2024    4. Pt will increase right stress position wrist flexion to 55 degrees.   5. Pt will increase right point of pain  testing with elbow extended to 20#.   6. Pt will report an increase in FOTO intake score of > 55%, which would indicate an improvement in quality of life.     Long Term Goals: (6 weeks)  1. Pt will report decreased right elbow pain to 0-1/10 with ADL/IADL tasks. - MET 4/15/2024  2. Pt will tolerate right full elbow extension with pronation to retrieve objects from workspace and shelving without pain.   3. Pt will report an increase in FOTO intake score of > 65%, which would indicate an improvement in quality of life.  4. Pt will exhibit right elbow ROM that is WNL as compared to left elbow.  5. Pt will increase right max elbow extended  to 40-50#.  6. Pt will return to PLOF, including typing at work and hunting.    Plan     Plan of Care Certification: 3/25/2024 to 5/3/2024      Outpatient Occupational Therapy 2 times weekly for 6 weeks to include the following interventions PRN: Manual Therapy, Moist Heat/ Ice, Neuromuscular Re-ed, Orthotic Management and Training, Patient Education, Self Care,  Therapeutic Activities, Therapeutic Exercise, and Ultrasound    Updates/Grading for next session: progress occupational therapy as tolerated    JUAN FRANCISCO MAYES OT

## 2024-04-18 ENCOUNTER — CLINICAL SUPPORT (OUTPATIENT)
Dept: REHABILITATION | Facility: HOSPITAL | Age: 44
End: 2024-04-18
Payer: COMMERCIAL

## 2024-04-18 DIAGNOSIS — M25.631 DECREASED RANGE OF MOTION OF RIGHT WRIST: ICD-10-CM

## 2024-04-18 DIAGNOSIS — M25.621 DECREASED ROM OF RIGHT ELBOW: ICD-10-CM

## 2024-04-18 DIAGNOSIS — G89.29 CHRONIC ELBOW PAIN, RIGHT: Primary | ICD-10-CM

## 2024-04-18 DIAGNOSIS — R29.898 DECREASED GRIP STRENGTH OF RIGHT HAND: ICD-10-CM

## 2024-04-18 DIAGNOSIS — M25.521 CHRONIC ELBOW PAIN, RIGHT: Primary | ICD-10-CM

## 2024-04-18 PROCEDURE — 97110 THERAPEUTIC EXERCISES: CPT | Mod: PN

## 2024-04-18 NOTE — PROGRESS NOTES
"  [facility]  Occupational Therapy Treatment Note & Progress Note     Date: 4/18/2024  Name: Anahy Mata  Clinic Number: [id number]    Therapy Diagnosis:   Encounter Diagnoses   Name Primary?    Chronic elbow pain, right Yes    Decreased  strength of right hand     Decreased ROM of right elbow     Decreased range of motion of right wrist      Physician: Anupam Smith MD    Physician Orders: OT eval and tx  Medical Diagnosis: Lateral epicondylitis of right elbow [M77.11]   Evaluation Date: 3/25/2024  Insurance Authorization Period Expiration: 12/31/2024  Plan of Care Certification Period: 3/25/2024 to 5/3/2024     Visit # / Visits authorized: 5/30  FOTO: 2/3     Surgical Procedure: right elbow tenotomy  Date of Surgery: 3/8/2024 - pt completes 6 weeks post-op on 4/19/2024                            Date of Return to MD: 4/23/2024     Precautions:  Standard    Time In: 1000  Time Out: 1055  Total Billable Time: 45 minutes    Subjective     Pt reports: "My elbow was bothering me a little yesterday but I think it's just the weather."    she was compliant with home exercise program given on evaluation.  Response to previous treatment: good  Functional change: improved right wrist ROM and  strength    Pain: 0/10  Location: right elbow    Objective   Objective measures updated on this date.    Stress position: (elbow extended, forearm pronated, wrist flexed)    4/18/2024:  Left: 71*   Right: 62*    3/25/2024:  Left: 72*   Right: 48*      Intake Outcome Measure for FOTO Elbow Survey     Therapist reviewed FOTO scores for Anahy Mata on 4/18/2024.   FOTO documents entered into [employer] - see Media section.     Intake Score: 67%  - 49% on evaluation on 3/25/2024      Predicted Functional Score: 67% in 13 visits    Treatment     Anahy received the treatments listed below:      therapeutic exercises to develop strength and ROM for 45 minutes including:  - wrist flexor, " extensor, and crossbody stretches x 30 sec holds each - with elbow fully extended  - elbow flex/ext with 3# DB, forearm 3 positions, x 15 reps each  - forearm pron/sup AROM (with elbow bent) with 1# pronator wand (hand 1/4 way up handle) x 20 reps  - forearm pron/sup AROM with 3# DB (with elbow extended) 2x10  - wrist flex/ext with 2# DB with FA neutral with elbow flexed, 10x  - wrist flex/ext with 2# DB with FA pronated and elbow flexed, 10x  - wrist flex/ext with 2# DB with FA supinated and elbow flexed, 10x  - wrist flex/ext with 2# DB with FA neutral with elbow extended, 10x  - wrist flex/ext with 2# DB with FA pronated and elbow extended, 10x  - wrist flex/ext with 2# DB with FA supinated and elbow extended, 10x  - active fist with blue digiciser with FA neutral and elbow flexed, 10x  - active fist with blue digiciser with FA pronated and and elbow flexed, 10x  - active fist with blue digiciser with FA supinated and and elbow flexed, 10x  - active fist with blue digiciser with FA neutral and elbow extended, 10x  - active fist with blue digiciser with FA pronated and elbow extended, 10x   - active fist with blue digiciser with FA supinated and elbow extended, 10x   - wrist extension eccentrics with 5# DB over bolster 3x10  - bilateral scap retraction AROM 3x10    direct contact modalities after being cleared for contraindications for 0 minutes including:    supervised modalities after being cleared for contradictions for 10 minutes including:  - MHP to right lateral elbow at start of session x 5 min to decrease stiffness and increase circulation  - cold/ice pack to right lateral elbow at end of session x 5 min    Home Exercises and Education Provided     Education provided:   - progress toward goals    Written Home Exercises Provided: Patient instructed to cont prior HEP  Exercises were reviewed and Anahy was able to demonstrate them prior to the end of the session. Anahy demonstrated good understanding of  the HEP provided.     See EMR under Patient Instructions for exercises provided during prior visit.        Assessment     Anahy was seen for her 5th tx session on this date. Objective measures updated and progress note completed. See below for goal achievement. She is exhibiting improved right elbow and wrist ROM and reporting a significant decrease in pain overall. She continues to feel mild discomfort at her elbow with full extension while strengthening but is making excellent progress. She also reports increased functional use.    Anahy is progressing towards her goals and there are no updates to goals at this time. Pt prognosis is Good.     Anahy will continue to benefit from skilled outpatient occupational therapy to address the deficits listed in the problem list on initial evaluation provide pt/family education and to maximize pt's level of independence in the home and community environment.     Pt's spiritual, cultural and educational needs considered and pt agreeable to plan of care and goals.    Anticipated barriers to occupational therapy: none    Goals:  Short Term Goals: (4 weeks)  1. Pt will be independent with HEP in 2 visits. - MET 3/27/2024  2. Pt will report decreased pain to a 5-6/10 with ADL/IADL tasks. - MET 4/1/2024  3. Pt will progress to right extrinsic stretches with elbow extended in 5 visits. - MET 4/1/2024    4. Pt will increase right stress position wrist flexion to 55 degrees. - MET 4/18/2024  5. Pt will increase right point of pain  testing with elbow extended to 20#. - MET 4/18/2024  6. Pt will report an increase in FOTO intake score of > 55%, which would indicate an improvement in quality of life. - MET 4/18/2024     Long Term Goals: (6 weeks)  1. Pt will report decreased right elbow pain to 0-1/10 with ADL/IADL tasks. - MET 4/15/2024  2. Pt will tolerate right full elbow extension with pronation to retrieve objects from workspace and shelving without pain. - progressing,  continue goal 4/18/2024  3. Pt will report an increase in FOTO intake score of > 65%, which would indicate an improvement in quality of life. - MET 4/18/2024  4. Pt will exhibit right elbow ROM that is WNL as compared to left elbow. - MET 4/18/2024  5. Pt will increase right max elbow extended  to 40-50#. - progressing, continue goal 4/18/2024  6. Pt will return to PLOF, including typing at work and hunting. - progressing, continue goal 4/18/2024    Plan     Plan of Care Certification: 3/25/2024 to 5/3/2024      Outpatient Occupational Therapy 2 times weekly for 6 weeks to include the following interventions PRN: Manual Therapy, Moist Heat/ Ice, Neuromuscular Re-ed, Orthotic Management and Training, Patient Education, Self Care, Therapeutic Activities, Therapeutic Exercise, and Ultrasound    Updates/Grading for next session: progress occupational therapy as tolerated    JUAN FRANCISCO MAYES OT

## 2024-04-19 ENCOUNTER — PATIENT MESSAGE (OUTPATIENT)
Dept: RESEARCH | Facility: HOSPITAL | Age: 44
End: 2024-04-19
Payer: COMMERCIAL

## 2024-04-22 ENCOUNTER — CLINICAL SUPPORT (OUTPATIENT)
Dept: REHABILITATION | Facility: HOSPITAL | Age: 44
End: 2024-04-22
Payer: COMMERCIAL

## 2024-04-22 DIAGNOSIS — R29.898 DECREASED GRIP STRENGTH OF RIGHT HAND: ICD-10-CM

## 2024-04-22 DIAGNOSIS — M25.521 CHRONIC ELBOW PAIN, RIGHT: Primary | ICD-10-CM

## 2024-04-22 DIAGNOSIS — M25.621 DECREASED ROM OF RIGHT ELBOW: ICD-10-CM

## 2024-04-22 DIAGNOSIS — M25.631 DECREASED RANGE OF MOTION OF RIGHT WRIST: ICD-10-CM

## 2024-04-22 DIAGNOSIS — G89.29 CHRONIC ELBOW PAIN, RIGHT: Primary | ICD-10-CM

## 2024-04-22 PROCEDURE — 97110 THERAPEUTIC EXERCISES: CPT | Mod: PN

## 2024-04-22 PROCEDURE — 97140 MANUAL THERAPY 1/> REGIONS: CPT | Mod: PN

## 2024-04-22 NOTE — PROGRESS NOTES
"  [facility]  Occupational Therapy Treatment Note      Date: 4/22/2024  Name: Anahy Mata  Clinic Number: [id number]    Therapy Diagnosis:   Encounter Diagnoses   Name Primary?    Chronic elbow pain, right Yes    Decreased  strength of right hand     Decreased ROM of right elbow     Decreased range of motion of right wrist      Physician: Anupam Smith MD    Physician Orders: OT eval and tx  Medical Diagnosis: Lateral epicondylitis of right elbow [M77.11]   Evaluation Date: 3/25/2024  Insurance Authorization Period Expiration: 12/31/2024  Plan of Care Certification Period: 3/25/2024 to 5/3/2024     Visit # / Visits authorized: 6/30  FOTO: 2/3     Surgical Procedure: right elbow tenotomy  Date of Surgery: 3/8/2024 - pt completed 6 weeks post-op on 4/19/2024                            Date of Return to MD: 4/23/2024     Precautions:  Standard    Time In: 1015  Time Out: 1108  Total Billable Time: 48 minutes    Subjective     Pt reports: "My arm is doing good. It's been a little tight but I think it's because of how I sleep. I don't have any pain."    she was compliant with home exercise program given on evaluation.  Response to previous treatment: good  Functional change: improved  strength    Pain: 0/10  Location: right elbow    Objective   Objective measures updated on this date.    Stress Position  Strength (in pounds):  Setting II    Left Right Right    3/25/2024 3/25/2024 4/22/2024   Elbow Flexed - Point of pain 58 10 46   Elbow Flexed - Max   58 18 46   Elbow Extended Pronated Point of pain 55 7 36   Elbow Extended Pronated Max         55 10 36       Treatment     Anahy received the treatments listed below:      therapeutic exercises to develop strength and ROM for 40 minutes including:  - wrist flexor, extensor, and crossbody stretches x 30 sec holds each - with elbow fully extended  - elbow flex/ext with 4# DB, forearm 3 positions, x 15 reps each  - " forearm pron/sup AROM (with elbow bent) with 1# pronator wand (hand 1/4 way up handle) x 20 reps  - forearm pron/sup AROM with 3# DB (with elbow extended) 2x10  - wrist flex/ext with 2# DB with FA neutral with elbow flexed, 10x  - wrist flex/ext with 2# DB with FA pronated and elbow flexed, 10x  - wrist flex/ext with 2# DB with FA supinated and elbow flexed, 10x  - wrist flex/ext with 2# DB with FA neutral with elbow extended, 10x  - wrist flex/ext with 2# DB with FA pronated and elbow extended, 10x  - wrist flex/ext with 2# DB with FA supinated and elbow extended, 10x  - active fist with blue digiciser with FA neutral and elbow flexed, 10x  - active fist with blue digiciser with FA pronated and and elbow flexed, 10x  - active fist with blue digiciser with FA supinated and and elbow flexed, 10x  - active fist with blue digiciser with FA neutral and elbow extended, 10x  - active fist with blue digiciser with FA pronated and elbow extended, 10x   - active fist with blue digiciser with FA supinated and elbow extended, 10x   - wrist extension eccentrics with 5# DB over bolster 3x10  - overhead press with 4# DB x 20 reps  - bilateral scap retraction AROM 3x10    Manual techniques x 8 minutes:  - IASTM of common extensor muscle bundle in proximal forearm to increase circulation and tissue extensibility    direct contact modalities after being cleared for contraindications for 0 minutes including:    supervised modalities after being cleared for contradictions for 5 minutes including:  - cold/ice pack to right lateral elbow at end of session x 5 min    Home Exercises and Education Provided     Education provided:   - progress toward goals    Written Home Exercises Provided: Patient instructed to cont prior HEP  Exercises were reviewed and Anahy was able to demonstrate them prior to the end of the session. Anahy demonstrated good understanding of the HEP provided.     See EMR under Patient Instructions for exercises  provided during prior visit.        Assessment     Anahy was seen for her 6th tx session on this date. She is exhibiting improved right hand  strength with no increased complaints of pain. She tolerated PRE's well.     Anahy is progressing towards her goals and there are no updates to goals at this time. Pt prognosis is Good.     Anahy will continue to benefit from skilled outpatient occupational therapy to address the deficits listed in the problem list on initial evaluation provide pt/family education and to maximize pt's level of independence in the home and community environment.     Pt's spiritual, cultural and educational needs considered and pt agreeable to plan of care and goals.    Anticipated barriers to occupational therapy: none    Goals:  Short Term Goals: (4 weeks)  1. Pt will be independent with HEP in 2 visits. - MET 3/27/2024  2. Pt will report decreased pain to a 5-6/10 with ADL/IADL tasks. - MET 4/1/2024  3. Pt will progress to right extrinsic stretches with elbow extended in 5 visits. - MET 4/1/2024    4. Pt will increase right stress position wrist flexion to 55 degrees. - MET 4/18/2024  5. Pt will increase right point of pain  testing with elbow extended to 20#. - MET 4/18/2024  6. Pt will report an increase in FOTO intake score of > 55%, which would indicate an improvement in quality of life. - MET 4/18/2024     Long Term Goals: (6 weeks)  1. Pt will report decreased right elbow pain to 0-1/10 with ADL/IADL tasks. - MET 4/15/2024  2. Pt will tolerate right full elbow extension with pronation to retrieve objects from workspace and shelving without pain. - MET 4/22/2024  3. Pt will report an increase in FOTO intake score of > 65%, which would indicate an improvement in quality of life. - MET 4/18/2024  4. Pt will exhibit right elbow ROM that is WNL as compared to left elbow. - MET 4/18/2024  5. Pt will increase right max elbow extended  to 40-50#. - partially met 4/22/2024  6.  Pt will return to PLOF, including typing at work and hunting. - MET 4/22/2024    Plan     Plan of Care Certification: 3/25/2024 to 5/3/2024      Outpatient Occupational Therapy 2 times weekly for 6 weeks to include the following interventions PRN: Manual Therapy, Moist Heat/ Ice, Neuromuscular Re-ed, Orthotic Management and Training, Patient Education, Self Care, Therapeutic Activities, Therapeutic Exercise, and Ultrasound    Updates/Grading for next session: progress occupational therapy as tolerated    JUAN FRANCISCO MAYES OT

## 2024-04-24 ENCOUNTER — PATIENT MESSAGE (OUTPATIENT)
Dept: SPORTS MEDICINE | Facility: CLINIC | Age: 44
End: 2024-04-24

## 2024-04-24 ENCOUNTER — OFFICE VISIT (OUTPATIENT)
Dept: SPORTS MEDICINE | Facility: CLINIC | Age: 44
End: 2024-04-24
Payer: COMMERCIAL

## 2024-04-24 DIAGNOSIS — M77.11 LATERAL EPICONDYLITIS OF RIGHT ELBOW: Primary | ICD-10-CM

## 2024-04-24 PROCEDURE — 99499 UNLISTED E&M SERVICE: CPT | Mod: 95,,, | Performed by: STUDENT IN AN ORGANIZED HEALTH CARE EDUCATION/TRAINING PROGRAM

## 2024-04-24 NOTE — PROGRESS NOTES
Patient ID: Anahy Mata  YOB: 1980  MRN: [id number]    Chief Complaint: No chief complaint on file.      History of Present Illness: Anahy Mata is a 44-year-old female presenting today for follow-up for 6 weeks status post TENJET right common extensor tendon.  Doing well continues with occupational therapy working on some strengthening exercises as well.  It is very happy with the results, returns work on Monday.    Past Medical History:   No past medical history on file.  Past Surgical History:   Procedure Laterality Date    CHOLECYSTECTOMY      NISSEN FUNDOPLICATION  2005    TUBAL LIGATION       Family History   Problem Relation Name Age of Onset    Hypertension Mother      No Known Problems Father       Social History     Socioeconomic History    Marital status:    Tobacco Use    Smoking status: Former     Current packs/day: 0.00     Types: Cigarettes     Quit date: 2011     Years since quittin.7    Smokeless tobacco: Never   Substance and Sexual Activity    Alcohol use: No    Drug use: No    Sexual activity: Yes     Partners: Male     Medication List with Changes/Refills   Current Medications    MELOXICAM (MOBIC) 15 MG TABLET    Take 1 tablet (15 mg total) by mouth once daily.    NITROFURANTOIN, MACROCRYSTAL-MONOHYDRATE, (MACROBID) 100 MG CAPSULE    Take 1 capsule (100 mg total) by mouth 2 (two) times daily.    TRAMADOL (ULTRAM) 50 MG TABLET    Take 1 tablet (50 mg total) by mouth every 12 (twelve) hours as needed for Pain.     Review of patient's allergies indicates:  No Known Allergies    Physical Exam:   There is no height or weight on file to calculate BMI.    GENERAL: Well appearing, in no acute distress.  HEAD: Normocephalic and atraumatic.  ENT: External ears and nose grossly normal.  EYES: EOMI bilaterally  PULMONARY: Respirations are grossly even and non-labored.  NEURO: Awake, alert, and oriented x 3.  SKIN: No obvious rashes appreciated.  PSYCH: Mood  & affect are appropriate.    Detailed MSK exam:     Right elbow exam virtually with patient instructing, no tenderness over the common extensor tendon no pain with resisted wrist extension or 3rd digit extension no pain with maximal flexion of the wrist.    Imaging:  X-Ray Elbow Complete Right  Narrative: EXAMINATION:  XR ELBOW COMPLETE 3 VIEW RIGHT    CLINICAL HISTORY:  . Pain in right elbow    TECHNIQUE:  AP, lateral, and oblique views of the right elbow were performed.    COMPARISON:  None    FINDINGS:  No acute abnormality or significant arthritic change.  Impression: As above    Electronically signed by: Nathaniel Sommer MD  Date:    10/09/2023  Time:    10:41    Relevant imaging results were reviewed and interpreted by me and per my read as above.  This was discussed with the patient and / or family today.     Assessment:  Anahy Mata is a 44 y.o. female presents today for follow-up virtually through telehealth for 6 week follow-up for right lateral elbow pain 6 weeks status post TENJET.  Doing well discussed continuing to fill out surveys no need to follow up with me as asymptomatic and continue with PT and eccentric strengthening as long as necessary.  Discussed plan with Radha her occupational therapist.  Okay to return to work on Monday follow-up as needed.    Lateral epicondylitis of right elbow           Anupam Smith MD    Disclaimer: This note was prepared using a voice recognition system and is likely to have sound alike errors within the text.

## 2024-04-24 NOTE — Clinical Note
She look great today.  Continue with strengthening as needed and okay to discharge when you are ready.  I am not going to see her again.  Thanks for your help

## 2024-06-01 ENCOUNTER — DOCUMENTATION ONLY (OUTPATIENT)
Dept: RESEARCH | Facility: HOSPITAL | Age: 44
End: 2024-06-01
Payer: COMMERCIAL

## 2024-06-01 NOTE — PROGRESS NOTES
PROTOCOL: TenJet HydroCision  SUBJECT  NUMBER: [id number]       Visit: 3 month follow up  Investigator: Dr. Anupam Smith      Patient completed the 3 month follow up questionnaires sent via [employer] survey link on 5/14/2024. The 2 week questionnaires were completed on 3/25/2024 and the 6 week questionnaires were completed on 4/24/2024. Patient will continue to complete the questionnaires remotely until the end of study.

## 2024-07-11 ENCOUNTER — HOSPITAL ENCOUNTER (OUTPATIENT)
Dept: RADIOLOGY | Facility: HOSPITAL | Age: 44
Discharge: HOME | End: 2024-07-11
Attending: NURSE PRACTITIONER
Payer: COMMERCIAL

## 2024-07-11 VITALS — HEIGHT: 63 IN | WEIGHT: 130.06 LBS | BODY MASS INDEX: 23.04 KG/M2

## 2024-07-11 DIAGNOSIS — Z12.31 ENCOUNTER FOR SCREENING MAMMOGRAM FOR BREAST CANCER: ICD-10-CM

## 2024-07-11 PROCEDURE — 77063 BREAST TOMOSYNTHESIS BI: CPT | Mod: 26,,, | Performed by: RADIOLOGY

## 2024-07-11 PROCEDURE — 77067 SCR MAMMO BI INCL CAD: CPT | Mod: 26,,, | Performed by: RADIOLOGY

## 2024-07-11 PROCEDURE — 77067 SCR MAMMO BI INCL CAD: CPT | Mod: TC

## 2024-08-16 ENCOUNTER — DOCUMENTATION ONLY (OUTPATIENT)
Dept: RESEARCH | Facility: HOSPITAL | Age: 44
End: 2024-08-16
Payer: COMMERCIAL

## 2024-08-16 NOTE — PROGRESS NOTES
PROTOCOL: TenJet HydroCision  SUBJECT  NUMBER: [id number]       Visit: 6 month follow up  Investigator: Dr. Anupam Smith        Patient completed the 6 month follow up questionnaires sent via [employer] survey link on 8/14/2024. T Patient will continue to complete the questionnaires remotely until the end of study.

## 2025-04-29 ENCOUNTER — OFFICE VISIT (OUTPATIENT)
Dept: FAMILY MEDICINE | Facility: CLINIC | Age: 45
End: 2025-04-29
Payer: COMMERCIAL

## 2025-04-29 ENCOUNTER — LAB VISIT (OUTPATIENT)
Dept: LAB | Facility: HOSPITAL | Age: 45
End: 2025-04-29
Attending: STUDENT IN AN ORGANIZED HEALTH CARE EDUCATION/TRAINING PROGRAM
Payer: COMMERCIAL

## 2025-04-29 VITALS
WEIGHT: 146.38 LBS | HEIGHT: 63 IN | TEMPERATURE: 98 F | OXYGEN SATURATION: 99 % | BODY MASS INDEX: 25.94 KG/M2 | HEART RATE: 92 BPM | SYSTOLIC BLOOD PRESSURE: 106 MMHG | DIASTOLIC BLOOD PRESSURE: 60 MMHG

## 2025-04-29 DIAGNOSIS — Z00.00 WELLNESS EXAMINATION: ICD-10-CM

## 2025-04-29 DIAGNOSIS — Z00.00 WELLNESS EXAMINATION: Primary | ICD-10-CM

## 2025-04-29 DIAGNOSIS — Z12.31 ENCOUNTER FOR SCREENING MAMMOGRAM FOR MALIGNANT NEOPLASM OF BREAST: ICD-10-CM

## 2025-04-29 DIAGNOSIS — Z12.11 COLON CANCER SCREENING: ICD-10-CM

## 2025-04-29 LAB
ABSOLUTE EOSINOPHIL (OHS): 0.06 K/UL
ABSOLUTE MONOCYTE (OHS): 0.66 K/UL (ref 0.3–1)
ABSOLUTE NEUTROPHIL COUNT (OHS): 3.82 K/UL (ref 1.8–7.7)
ALBUMIN SERPL BCP-MCNC: 3.8 G/DL (ref 3.5–5.2)
ALP SERPL-CCNC: 44 UNIT/L (ref 40–150)
ALT SERPL W/O P-5'-P-CCNC: 11 UNIT/L (ref 10–44)
ANION GAP (OHS): 6 MMOL/L (ref 8–16)
AST SERPL-CCNC: 14 UNIT/L (ref 11–45)
BASOPHILS # BLD AUTO: 0.05 K/UL
BASOPHILS NFR BLD AUTO: 0.7 %
BILIRUB SERPL-MCNC: 0.4 MG/DL (ref 0.1–1)
BUN SERPL-MCNC: 8 MG/DL (ref 6–20)
CALCIUM SERPL-MCNC: 8.7 MG/DL (ref 8.7–10.5)
CHLORIDE SERPL-SCNC: 109 MMOL/L (ref 95–110)
CHOLEST SERPL-MCNC: 116 MG/DL (ref 120–199)
CHOLEST/HDLC SERPL: 3.1 {RATIO} (ref 2–5)
CO2 SERPL-SCNC: 22 MMOL/L (ref 23–29)
CREAT SERPL-MCNC: 0.8 MG/DL (ref 0.5–1.4)
EAG (OHS): 94 MG/DL (ref 68–131)
ERYTHROCYTE [DISTWIDTH] IN BLOOD BY AUTOMATED COUNT: 12.9 % (ref 11.5–14.5)
GFR SERPLBLD CREATININE-BSD FMLA CKD-EPI: >60 ML/MIN/1.73/M2
GLUCOSE SERPL-MCNC: 81 MG/DL (ref 70–110)
HBA1C MFR BLD: 4.9 % (ref 4–5.6)
HCT VFR BLD AUTO: 40.3 % (ref 37–48.5)
HDLC SERPL-MCNC: 37 MG/DL (ref 40–75)
HDLC SERPL: 31.9 % (ref 20–50)
HGB BLD-MCNC: 13.5 GM/DL (ref 12–16)
IMM GRANULOCYTES # BLD AUTO: 0.02 K/UL (ref 0–0.04)
IMM GRANULOCYTES NFR BLD AUTO: 0.3 % (ref 0–0.5)
LDLC SERPL CALC-MCNC: 68 MG/DL (ref 63–159)
LYMPHOCYTES # BLD AUTO: 2.14 K/UL (ref 1–4.8)
MCH RBC QN AUTO: 31.9 PG (ref 27–31)
MCHC RBC AUTO-ENTMCNC: 33.5 G/DL (ref 32–36)
MCV RBC AUTO: 95 FL (ref 82–98)
NONHDLC SERPL-MCNC: 79 MG/DL
NUCLEATED RBC (/100WBC) (OHS): 0 /100 WBC
PLATELET # BLD AUTO: 302 K/UL (ref 150–450)
PMV BLD AUTO: 11.2 FL (ref 9.2–12.9)
POTASSIUM SERPL-SCNC: 4.1 MMOL/L (ref 3.5–5.1)
PROT SERPL-MCNC: 6.7 GM/DL (ref 6–8.4)
RBC # BLD AUTO: 4.23 M/UL (ref 4–5.4)
RELATIVE EOSINOPHIL (OHS): 0.9 %
RELATIVE LYMPHOCYTE (OHS): 31.7 % (ref 18–48)
RELATIVE MONOCYTE (OHS): 9.8 % (ref 4–15)
RELATIVE NEUTROPHIL (OHS): 56.6 % (ref 38–73)
SODIUM SERPL-SCNC: 137 MMOL/L (ref 136–145)
TRIGL SERPL-MCNC: 55 MG/DL (ref 30–150)
TSH SERPL-ACNC: 1.69 UIU/ML (ref 0.4–4)
WBC # BLD AUTO: 6.75 K/UL (ref 3.9–12.7)

## 2025-04-29 PROCEDURE — 80053 COMPREHEN METABOLIC PANEL: CPT

## 2025-04-29 PROCEDURE — 36415 COLL VENOUS BLD VENIPUNCTURE: CPT | Mod: PN

## 2025-04-29 PROCEDURE — 83036 HEMOGLOBIN GLYCOSYLATED A1C: CPT

## 2025-04-29 PROCEDURE — 80061 LIPID PANEL: CPT

## 2025-04-29 PROCEDURE — 99999 PR PBB SHADOW E&M-EST. PATIENT-LVL III: CPT | Mod: PBBFAC,,, | Performed by: STUDENT IN AN ORGANIZED HEALTH CARE EDUCATION/TRAINING PROGRAM

## 2025-04-29 PROCEDURE — 85025 COMPLETE CBC W/AUTO DIFF WBC: CPT

## 2025-04-29 PROCEDURE — 84443 ASSAY THYROID STIM HORMONE: CPT

## 2025-04-29 NOTE — PROGRESS NOTES
Patient ID: Anahy Mata is a 45 y.o. female.    Chief Complaint: Annual Exam    History of Present Illness    CHIEF COMPLAINT:  Ms. Mata presents today to establish care and for annual wellness    MEDICAL HISTORY:  She has a history of reflux requiring fundoplication for hiatal hernia approximately 20 years ago. She also has a history of polyps found on previous colonoscopies.    FAMILY HISTORY:  Father has history of prostate cancer in 2023 treated with surgery and radiation. He was recently diagnosed with Non-Hodgkin's lymphoma in face and plasmacytoma in spine.    PREVENTIVE CARE:  She completed mammogram last year.      ROS:  General: -fever, -chills, -fatigue, -weight gain, -weight loss  Eyes: -vision changes, -redness, -discharge  ENT: -ear pain, -nasal congestion, -sore throat  Cardiovascular: -chest pain, -palpitations, -lower extremity edema  Respiratory: -cough, -shortness of breath  Gastrointestinal: -abdominal pain, -nausea, -vomiting, -diarrhea, -constipation, -blood in stool  Genitourinary: -dysuria, -hematuria, -frequency  Musculoskeletal: -joint pain, -muscle pain  Skin: -rash, -lesion  Neurological: -headache, -dizziness, -numbness, -tingling  Psychiatric: -anxiety, -depression, -sleep difficulty         Pmh, Psh, Family Hx, Social Hx updated in Epic Tabs today.       4/29/2025     8:00 AM 11/17/2021     9:07 AM   Depression Patient Health Questionnaire   Over the last two weeks how often have you been bothered by little interest or pleasure in doing things Not at all Not at all    Over the last two weeks how often have you been bothered by feeling down, depressed or hopeless Not at all Not at all    PHQ-2 Total Score 0 0       Data saved with a previous flowsheet row definition       Active Problem List with Overview Notes    Diagnosis Date Noted    Decreased ROM of right elbow 03/25/2024    Decreased range of motion of right wrist 03/25/2024    Ulnar neuropathy at elbow of right upper  extremity 2024    Chronic elbow pain, right 2024    Decreased  strength of right hand 2024    History of gastroesophageal reflux (GERD) 2017       History reviewed. No pertinent past medical history.    Past Surgical History:   Procedure Laterality Date    CHOLECYSTECTOMY      NISSEN FUNDOPLICATION  2005    TUBAL LIGATION         Family History   Problem Relation Name Age of Onset    Hypertension Mother      No Known Problems Father         Social History     Socioeconomic History    Marital status:    Tobacco Use    Smoking status: Former     Current packs/day: 0.00     Types: Cigarettes     Quit date: 2011     Years since quittin.8     Passive exposure: Never    Smokeless tobacco: Never   Substance and Sexual Activity    Alcohol use: No    Drug use: No    Sexual activity: Yes     Partners: Male     Social Drivers of Health     Financial Resource Strain: Low Risk  (2025)    Overall Financial Resource Strain (CARDIA)     Difficulty of Paying Living Expenses: Not hard at all   Food Insecurity: No Food Insecurity (2025)    Hunger Vital Sign     Worried About Running Out of Food in the Last Year: Never true     Ran Out of Food in the Last Year: Never true   Transportation Needs: No Transportation Needs (2025)    PRAPARE - Transportation     Lack of Transportation (Medical): No     Lack of Transportation (Non-Medical): No   Physical Activity: Inactive (2025)    Exercise Vital Sign     Days of Exercise per Week: 0 days     Minutes of Exercise per Session: 30 min   Stress: Stress Concern Present (2025)    [employer] - Occupational Stress Questionnaire     Feeling of Stress : To some extent   Housing Stability: High Risk (2025)    Housing Stability Vital Sign     Unable to Pay for Housing in the Last Year: Yes     Number of Times Moved in the Last Year: 0     Homeless in the Last Year: No       Medications Ordered Prior  to Encounter[1]    Review of patient's allergies indicates:  No Known Allergies      Review of Systems    General - Well developed, alert and oriented in NAD  HEENT - normocephalic, no evidence of trauma, sclera white, EOMI  Neck - full range of motion  COR - regular rate and rhythm without murmurs or gallops  Lungs - Clear  Abdomen - soft, non-tender  Ext - no cyanosis or edema    Physical Exam     Assessment:     1. Wellness examination    2. Encounter for screening mammogram for malignant neoplasm of breast    3. Colon cancer screening        LABS:   Lab Results   Component Value Date    HGBA1C 5.1 03/21/2023      Lab Results   Component Value Date    CHOL 125 01/30/2023     Lab Results   Component Value Date    LDLCALC 69 01/30/2023     Lab Results   Component Value Date    WBC 7.90 03/21/2023    HGB 15.2 03/21/2023    HCT 46.1 03/21/2023     03/21/2023    CHOL 125 01/30/2023    TRIG 82 01/30/2023    HDL 40 01/30/2023    ALT 30 03/21/2023    AST 17 03/21/2023     03/21/2023    K 4.5 03/21/2023     03/21/2023    CREATININE 0.9 03/21/2023    BUN 10 03/21/2023    CO2 24 03/21/2023    TSH 1.860 01/30/2023    HGBA1C 5.1 03/21/2023       Plan:   Anahy was seen today for annual exam.    Diagnoses and all orders for this visit:    Wellness examination  Discussed and recommended healthy eating habits and lifestyle changes including daily exercise of 30 mins, low salt diet, low fat diet and weight loss/maintenance to a normal body mass index 25 or below. I recommend routine use of seatbelts. Discouraged illicit drug use including tobacco use. I recommend no alcohol use but if you decide to drink, limit to 1 drink per day for females and 2 drinks per day for males. Keep up with your yearly physical visit with age appropriate screenings, vaccinations, and labs. ;  -     Comprehensive Metabolic Panel; Future  -     Hemoglobin A1C; Future  -     CBC Auto Differential; Future  -     Lipid Panel; Future  -      TSH; Future    Encounter for screening mammogram for malignant neoplasm of breast  -     Mammo Digital Screening Bilat; Future    Colon cancer screening  -     Ambulatory referral/consult to Endo Procedure ; Future        Assessment & Plan          Face to Face time with patient:  8:00 AM CDT -      Each patient to whom he or she provides medical services by telemedicine is:  (1) informed of the relationship between the physician and patient and the respective role of any other health care provider with respect to management of the patient; and (2) notified that he or she may decline to receive medical services by telemedicine and may withdraw from such care at any time.        There are no Patient Instructions on file for this visit.    No follow-ups on file.  The ASCVD Risk score (Carlos BOOTHE, et al., 2019) failed to calculate for the following reasons:    The valid total cholesterol range is 130 to 320 mg/dL    This note was generated with the assistance of ambient listening technology. Verbal consent was obtained by the patient and accompanying visitor(s) for the recording of patient appointment to facilitate this note. I attest to having reviewed and edited the generated note for accuracy, though some syntax or spelling errors may persist. Please contact the author of this note for any clarification.         [1]   Current Outpatient Medications on File Prior to Visit   Medication Sig Dispense Refill    [DISCONTINUED] meloxicam (MOBIC) 15 MG tablet Take 1 tablet (15 mg total) by mouth once daily. 30 tablet 1    [DISCONTINUED] nitrofurantoin, macrocrystal-monohydrate, (MACROBID) 100 MG capsule Take 1 capsule (100 mg total) by mouth 2 (two) times daily. 20 capsule 0    [DISCONTINUED] traMADoL (ULTRAM) 50 mg tablet Take 1 tablet (50 mg total) by mouth every 12 (twelve) hours as needed for Pain. 14 each 0     No current facility-administered medications on file prior to visit.

## 2025-05-01 ENCOUNTER — RESULTS FOLLOW-UP (OUTPATIENT)
Dept: FAMILY MEDICINE | Facility: CLINIC | Age: 45
End: 2025-05-01

## 2025-05-08 ENCOUNTER — HOSPITAL ENCOUNTER (OUTPATIENT)
Dept: PREADMISSION TESTING | Facility: HOSPITAL | Age: 45
Discharge: HOME | End: 2025-05-08
Attending: COLON & RECTAL SURGERY
Payer: COMMERCIAL

## 2025-05-08 ENCOUNTER — PATIENT MESSAGE (OUTPATIENT)
Dept: RESEARCH | Facility: HOSPITAL | Age: 45
End: 2025-05-08
Payer: COMMERCIAL

## 2025-05-08 DIAGNOSIS — Z12.11 COLON CANCER SCREENING: Primary | ICD-10-CM

## 2025-05-08 RX ORDER — POLYETHYLENE GLYCOL 3350, SODIUM SULFATE ANHYDROUS, SODIUM BICARBONATE, SODIUM CHLORIDE, POTASSIUM CHLORIDE 236; 22.74; 6.74; 5.86; 2.97 G/4L; G/4L; G/4L; G/4L; G/4L
4 POWDER, FOR SOLUTION ORAL ONCE
Qty: 4000 ML | Refills: 0 | Status: SHIPPED | OUTPATIENT
Start: 2025-05-08 | End: 2025-05-08

## 2025-05-27 ENCOUNTER — ANESTHESIA (OUTPATIENT)
Dept: ENDOSCOPY | Facility: HOSPITAL | Age: 45
End: 2025-05-27
Payer: COMMERCIAL

## 2025-05-27 ENCOUNTER — HOSPITAL ENCOUNTER (OUTPATIENT)
Dept: ENDOSCOPY | Facility: HOSPITAL | Age: 45
Discharge: HOME | End: 2025-05-27
Attending: STUDENT IN AN ORGANIZED HEALTH CARE EDUCATION/TRAINING PROGRAM
Payer: COMMERCIAL

## 2025-05-27 ENCOUNTER — ANESTHESIA EVENT (OUTPATIENT)
Dept: ENDOSCOPY | Facility: HOSPITAL | Age: 45
End: 2025-05-27
Payer: COMMERCIAL

## 2025-05-27 DIAGNOSIS — Z12.11 COLON CANCER SCREENING: ICD-10-CM

## 2025-05-27 LAB
B-HCG UR QL: NEGATIVE
CTP QC/QA: YES

## 2025-05-27 PROCEDURE — 37000009 HC ANESTHESIA EA ADD 15 MINS

## 2025-05-27 PROCEDURE — 27201089 HC SNARE, DISP (ANY): Performed by: STUDENT IN AN ORGANIZED HEALTH CARE EDUCATION/TRAINING PROGRAM

## 2025-05-27 PROCEDURE — 37000008 HC ANESTHESIA 1ST 15 MINUTES

## 2025-05-27 PROCEDURE — 25000003 PHARM REV CODE 250

## 2025-05-27 PROCEDURE — 63600175 PHARM REV CODE 636 W HCPCS

## 2025-05-27 PROCEDURE — 81025 URINE PREGNANCY TEST: CPT | Performed by: STUDENT IN AN ORGANIZED HEALTH CARE EDUCATION/TRAINING PROGRAM

## 2025-05-27 PROCEDURE — 88305 TISSUE EXAM BY PATHOLOGIST: CPT | Mod: TC | Performed by: STUDENT IN AN ORGANIZED HEALTH CARE EDUCATION/TRAINING PROGRAM

## 2025-05-27 PROCEDURE — 45385 COLONOSCOPY W/LESION REMOVAL: CPT | Mod: 33 | Performed by: STUDENT IN AN ORGANIZED HEALTH CARE EDUCATION/TRAINING PROGRAM

## 2025-05-27 RX ORDER — PROPOFOL 10 MG/ML
VIAL (ML) INTRAVENOUS
Status: DISCONTINUED | OUTPATIENT
Start: 2025-05-27 | End: 2025-05-27

## 2025-05-27 RX ORDER — LIDOCAINE HYDROCHLORIDE 10 MG/ML
INJECTION, SOLUTION EPIDURAL; INFILTRATION; INTRACAUDAL; PERINEURAL
Status: DISCONTINUED | OUTPATIENT
Start: 2025-05-27 | End: 2025-05-27

## 2025-05-27 RX ADMIN — PROPOFOL 50 MG: 10 INJECTION, EMULSION INTRAVENOUS at 10:05

## 2025-05-27 RX ADMIN — PROPOFOL 100 MG: 10 INJECTION, EMULSION INTRAVENOUS at 10:05

## 2025-05-27 RX ADMIN — LIDOCAINE HYDROCHLORIDE 20 MG: 10 SOLUTION INTRAVENOUS at 10:05

## 2025-05-27 RX ADMIN — PROPOFOL 20 MG: 10 INJECTION, EMULSION INTRAVENOUS at 10:05

## 2025-05-27 RX ADMIN — PROPOFOL 40 MG: 10 INJECTION, EMULSION INTRAVENOUS at 10:05

## 2025-05-27 RX ADMIN — SODIUM CHLORIDE: 0.9 INJECTION, SOLUTION INTRAVENOUS at 10:05

## 2025-05-27 NOTE — ANESTHESIA PREPROCEDURE EVALUATION
05/27/2025  Anahy Mata is a 45 y.o., female.    Problem List[1]  No past medical history on file.  Past Surgical History:   Procedure Laterality Date    CHOLECYSTECTOMY      NISSEN FUNDOPLICATION  2005    TUBAL LIGATION         Pre-op Assessment    I have reviewed the Patient Summary Reports.     I have reviewed the Nursing Notes. I have reviewed the NPO Status.   I have reviewed the Medications.     Review of Systems  Anesthesia Hx:               Denies Personal Hx of Anesthesia complications.                    Social:  Former Smoker, No Alcohol Use       Cardiovascular:  Cardiovascular Normal Exercise tolerance: good                                             Pulmonary:  Pulmonary Normal                       Hepatic/GI:  Bowel Prep.   GERD                Neurological:        Peripheral Neuropathy                                Physical Exam  General: Well nourished, Cooperative, Alert and Oriented    Airway:  Mallampati: II   Mouth Opening: Normal  TM Distance: Normal  Tongue: Normal  Neck ROM: Normal ROM    Chest/Lungs:  Normal Respiratory Rate    Heart:  Rate: Normal  Rhythm: Regular Rhythm        Anesthesia Plan  Type of Anesthesia, risks & benefits discussed:    Anesthesia Type: MAC, Gen Natural Airway  Intra-op Monitoring Plan: Standard ASA Monitors  Induction:  IV  Informed Consent: Informed consent signed with the Patient and all parties understand the risks and agree with anesthesia plan.  All questions answered.   ASA Score: 2  Day of Surgery Review of History & Physical: H&P Update referred to the surgeon/provider.    Ready For Surgery From Anesthesia Perspective.     .           [1]   Patient Active Problem List  Diagnosis    History of gastroesophageal reflux (GERD)    Chronic elbow pain, right    Decreased  strength of right hand    Ulnar neuropathy at elbow of right upper  extremity    Decreased ROM of right elbow    Decreased range of motion of right wrist

## 2025-05-27 NOTE — H&P
[facility] ([facility])  Colon and Rectal Surgery  History & Physical    Patient Name: Anahy Mata  MRN: [id number]  Admission Date: 2025  Attending Physician: Julieth Wong MD  Primary Care Provider: Holly English MD    Patient information was obtained from patient and medical records.    Subjective:     Chief Complaint/Reason for Admission: Here for Colonoscopy    History of Present Illness:  Patient is a 45 y.o. female presents for colonoscopy. Had scopes prior with polyps    Denies changes in bowel habits such as bleeding, melena, painful bowel movements, change in caliber of stool, diarrhea or constipation.    Denies FH of colorectal cancer, polyps or IBD       No current outpatient medications on file prior to encounter.     No current facility-administered medications on file prior to encounter.       Review of patient's allergies indicates:  No Known Allergies    History reviewed. No pertinent past medical history.  Past Surgical History:   Procedure Laterality Date    CHOLECYSTECTOMY      NISSEN FUNDOPLICATION  2005    TUBAL LIGATION       Family History       Problem Relation (Age of Onset)    Hypertension Mother    No Known Problems Father          Tobacco Use    Smoking status: Former     Current packs/day: 0.00     Types: Cigarettes     Quit date: 2011     Years since quittin.8     Passive exposure: Never    Smokeless tobacco: Never   Vaping Use    Vaping status: Never Used   Substance and Sexual Activity    Alcohol use: No    Drug use: No    Sexual activity: Yes     Partners: Male       Objective:     Vital Signs (Most Recent):  Temp: 98.5 °F (36.9 °C) (25 1011)  Pulse: 80 (25 1011)  Resp: 18 (25 1011)  BP: (!) 114/58 (25 1011)  SpO2: 100 % (25 1011) Vital Signs (24h Range):  Temp:  [98.5 °F (36.9 °C)] 98.5 °F (36.9 °C)  Pulse:  [80] 80  Resp:  [18] 18  SpO2:  [100 %] 100 %  BP: (114)/(58) 114/58     Weight: 63.5 kg (140 lb)  Body mass  index is 24.8 kg/m².    Physical Exam  Constitutional:       Appearance: She is well-developed.   HENT:      Head: Normocephalic and atraumatic.   Eyes:      Conjunctiva/sclera: Conjunctivae normal.      Pupils: Pupils are equal, round, and reactive to light.   Neck:      Thyroid: No thyromegaly.   Cardiovascular:      Rate and Rhythm: Normal rate and regular rhythm.   Pulmonary:      Effort: Pulmonary effort is normal. No respiratory distress.   Abdominal:      General: There is no distension.      Palpations: Abdomen is soft. There is no mass.      Tenderness: There is no abdominal tenderness.   Musculoskeletal:         General: No tenderness. Normal range of motion.      Cervical back: Normal range of motion.   Skin:     General: Skin is warm and dry.      Capillary Refill: Capillary refill takes less than 2 seconds.   Neurological:      General: No focal deficit present.      Mental Status: She is alert and oriented to person, place, and time.           Assessment/Plan:     Patient is a 45 y.o. female who presents for colonoscopy     - Ok to proceed to endoscopy suite for colonoscopy  - Consent obtained. All risks, benefits and alternatives fully explained to patient, including but not limited to bleeding, infection, perforation, and missed polyps. All questions appropriately answered to patient's satisfaction. Consent signed and placed on chart.    There are no hospital problems to display for this patient.    VTE Risk Mitigation (From admission, onward)      None            Julieth Wong MD  Colon and Rectal Surgery  [facility] ([facility])

## 2025-05-27 NOTE — PLAN OF CARE
Dr. Wong  at bedside discussing findings. VSS. Patient alert. No N/V. No bleeding, no pain. Patient released from unit.

## 2025-05-27 NOTE — TRANSFER OF CARE
"Anesthesia Transfer of Care Note    Patient: Anahy Mata    Procedure(s) Performed: * No procedures listed *    Patient location: GI    Anesthesia Type: MAC    Transport from OR: Transported from OR on room air with adequate spontaneous ventilation    Post pain: adequate analgesia    Post assessment: no apparent anesthetic complications    Post vital signs: stable    Level of consciousness: awake    Nausea/Vomiting: no nausea/vomiting    Complications: none    Transfer of care protocol was followed      Last vitals: Visit Vitals  BP (!) 114/58 (BP Location: Left arm, Patient Position: Sitting)   Pulse 80   Temp 36.9 °C (98.5 °F) (Temporal)   Resp 18   Ht 5' 3" (1.6 m)   Wt 63.5 kg (140 lb)   SpO2 100%   Breastfeeding No   BMI 24.80 kg/m²     "

## 2025-05-27 NOTE — ANESTHESIA POSTPROCEDURE EVALUATION
Anesthesia Post Evaluation    Patient: Anahy Mata    Procedure(s) Performed: * No procedures listed *    Final Anesthesia Type: MAC      Patient location during evaluation: GI PACU  Patient participation: Yes- Able to Participate  Level of consciousness: awake and alert  Post-procedure vital signs: reviewed and stable  Pain management: adequate  Airway patency: patent    PONV status at discharge: No PONV  Anesthetic complications: no      Cardiovascular status: blood pressure returned to baseline and hemodynamically stable  Respiratory status: unassisted, spontaneous ventilation and room air  Hydration status: euvolemic  Follow-up not needed.              Vitals Value Taken Time   BP 91/55 05/27/25 11:15   Temp 36.6 °C (97.9 °F) 05/27/25 11:10   Pulse 71 05/27/25 11:15   Resp 17 05/27/25 11:15   SpO2 99 % 05/27/25 11:15         Event Time   Out of Recovery 11:22:29         Pain/Patria Score: Patria Score: 10 (5/27/2025 11:15 AM)

## 2025-05-28 VITALS
BODY MASS INDEX: 24.8 KG/M2 | HEART RATE: 71 BPM | RESPIRATION RATE: 17 BRPM | SYSTOLIC BLOOD PRESSURE: 91 MMHG | OXYGEN SATURATION: 99 % | TEMPERATURE: 98 F | DIASTOLIC BLOOD PRESSURE: 55 MMHG | WEIGHT: 140 LBS | HEIGHT: 63 IN

## 2025-05-29 LAB
ESTROGEN SERPL-MCNC: NORMAL PG/ML
INSULIN SERPL-ACNC: NORMAL U[IU]/ML
LAB AP CLINICAL INFORMATION: NORMAL
LAB AP DIAGNOSIS CATEGORY: NORMAL
LAB AP GROSS DESCRIPTION: NORMAL
LAB AP PERFORMING LOCATION(S): NORMAL
LAB AP REPORT FOOTNOTES: NORMAL

## 2025-06-02 ENCOUNTER — RESULTS FOLLOW-UP (OUTPATIENT)
Dept: SURGERY | Facility: CLINIC | Age: 45
End: 2025-06-02

## 2025-06-02 NOTE — PROGRESS NOTES
Your results look fine and do not require any change in treatment. Your polyp returned hyperplastic. This does not require sooner screening. Please repeat colonoscopy in 10 years    Please contact me if you have any additional concerns.    Sincerely,    Julieth Wong

## 2025-07-17 ENCOUNTER — OFFICE VISIT (OUTPATIENT)
Dept: FAMILY MEDICINE | Facility: CLINIC | Age: 45
End: 2025-07-17
Payer: COMMERCIAL

## 2025-07-17 DIAGNOSIS — B02.9 HERPES ZOSTER WITHOUT COMPLICATION: Primary | ICD-10-CM

## 2025-07-17 PROCEDURE — 3044F HG A1C LEVEL LT 7.0%: CPT | Mod: CPTII,95,, | Performed by: STUDENT IN AN ORGANIZED HEALTH CARE EDUCATION/TRAINING PROGRAM

## 2025-07-17 PROCEDURE — 1159F MED LIST DOCD IN RCRD: CPT | Mod: CPTII,95,, | Performed by: STUDENT IN AN ORGANIZED HEALTH CARE EDUCATION/TRAINING PROGRAM

## 2025-07-17 PROCEDURE — 98006 SYNCH AUDIO-VIDEO EST MOD 30: CPT | Mod: 95,,, | Performed by: STUDENT IN AN ORGANIZED HEALTH CARE EDUCATION/TRAINING PROGRAM

## 2025-07-17 RX ORDER — VALACYCLOVIR HYDROCHLORIDE 1 G/1
1000 TABLET, FILM COATED ORAL 2 TIMES DAILY
Qty: 2 TABLET | Refills: 6 | Status: SHIPPED | OUTPATIENT
Start: 2025-07-17 | End: 2025-07-24

## 2025-07-17 NOTE — PROGRESS NOTES
"The patient location is: [place]  The chief complaint leading to consultation is: shingles    Visit type: audiovisual    Face to Face time with patient: 10  32 minutes of total time spent on the encounter, which includes face to face time and non-face to face time preparing to see the patient (eg, review of tests), Obtaining and/or reviewing separately obtained history, Documenting clinical information in the electronic or other health record, Independently interpreting results (not separately reported) and communicating results to the patient/family/caregiver, or Care coordination (not separately reported).         Each patient to whom he or she provides medical services by telemedicine is:  (1) informed of the relationship between the physician and patient and the respective role of any other health care provider with respect to management of the patient; and (2) notified that he or she may decline to receive medical services by telemedicine and may withdraw from such care at any time.    Notes:       Patient ID: Anahy Mata is a 45 y.o. female.    Chief Complaint: Herpes Zoster    History of Present Illness    CHIEF COMPLAINT:  Ms. Mata presents today with concerns about possible shingles.    HISTORY OF PRESENT ILLNESS:  She reports nerve pain in the arm characterized by extreme sensitivity, describing it as "needles scraping" along the skin. The pain affects the entire arm without visible skin changes. She notes heightened tactile sensitivity where even light touch is extremely painful. She also reports a possible subcutaneous lesion on the shoulder and a sore spot behind the ear. She is experiencing headaches.    MEDICAL HISTORY:  She has a history of shingles affecting her neck and face, which primarily manifested as nerve pain with minimal rash or blisters.    SOCIAL HISTORY:  She reports significant recent stressors including her 's cancer diagnosis with a two-week hospitalization. " Her  had shingles approximately two months ago.      ROS:  General: -fever, -chills, -fatigue, -weight gain, -weight loss  Eyes: -vision changes, -redness, -discharge  ENT: -ear pain, -nasal congestion, -sore throat  Cardiovascular: -chest pain, -palpitations, -lower extremity edema  Respiratory: -cough, -shortness of breath  Gastrointestinal: -abdominal pain, -nausea, -vomiting, -diarrhea, -constipation, -blood in stool  Genitourinary: -dysuria, -hematuria, -frequency  Musculoskeletal: -joint pain, -muscle pain  Skin: -rash, +lesion, +lumps/masses  Neurological: +headache, -dizziness, -numbness, -tingling, +shooting pain sensation, +nerve pain  Psychiatric: -anxiety, -depression, -sleep difficulty         Pmh, Psh, Family Hx, Social Hx updated in Epic Tabs today.       4/29/2025     8:00 AM 11/17/2021     9:07 AM   Depression Patient Health Questionnaire   Over the last two weeks how often have you been bothered by little interest or pleasure in doing things Not at all Not at all    Over the last two weeks how often have you been bothered by feeling down, depressed or hopeless Not at all Not at all    PHQ-2 Total Score 0 0       Data saved with a previous flowsheet row definition       Active Problem List with Overview Notes    Diagnosis Date Noted    Decreased ROM of right elbow 03/25/2024    Decreased range of motion of right wrist 03/25/2024    Ulnar neuropathy at elbow of right upper extremity 02/20/2024    Chronic elbow pain, right 01/25/2024    Decreased  strength of right hand 01/25/2024    History of gastroesophageal reflux (GERD) 07/12/2017       History reviewed. No pertinent past medical history.    Past Surgical History:   Procedure Laterality Date    CHOLECYSTECTOMY      NISSEN FUNDOPLICATION  2005    TUBAL LIGATION         Family History   Problem Relation Name Age of Onset    Hypertension Mother Tori ayala     No Known Problems Father         Social History     Socioeconomic History     Marital status:    Tobacco Use    Smoking status: Former     Current packs/day: 0.00     Types: Cigarettes     Quit date: 2011     Years since quittin.0     Passive exposure: Never    Smokeless tobacco: Never   Vaping Use    Vaping status: Never Used   Substance and Sexual Activity    Alcohol use: No    Drug use: No    Sexual activity: Yes     Partners: Male     Birth control/protection: Other-see comments     Comment: Tubes tied     Social Drivers of Health     Financial Resource Strain: Low Risk  (2025)    Overall Financial Resource Strain (CARDIA)     Difficulty of Paying Living Expenses: Not hard at all   Food Insecurity: No Food Insecurity (2025)    Hunger Vital Sign     Worried About Running Out of Food in the Last Year: Never true     Ran Out of Food in the Last Year: Never true   Transportation Needs: No Transportation Needs (2025)    PRAPARE - Transportation     Lack of Transportation (Medical): No     Lack of Transportation (Non-Medical): No   Physical Activity: Inactive (2025)    Exercise Vital Sign     Days of Exercise per Week: 0 days     Minutes of Exercise per Session: 30 min   Stress: Stress Concern Present (2025)    [employer] - Occupational Stress Questionnaire     Feeling of Stress : To some extent   Housing Stability: High Risk (2025)    Housing Stability Vital Sign     Unable to Pay for Housing in the Last Year: Yes     Number of Times Moved in the Last Year: 0     Homeless in the Last Year: No       Medications Ordered Prior to Encounter[1]    Review of patient's allergies indicates:  No Known Allergies      Review of Systems    Physical Exam   Constitutional:   Pt viewed via phone camera, She is awake and alert, in no distress, able to provide clear and full history       Physical Exam     Assessment:     1. Herpes zoster without complication        LABS:   Lab Results   Component Value Date    HGBA1C 4.9 2025     HGBA1C 5.1 03/21/2023      Lab Results   Component Value Date    CHOL 116 (L) 04/29/2025    CHOL 125 01/30/2023     Lab Results   Component Value Date    LDLCALC 68.0 04/29/2025    LDLCALC 69 01/30/2023     Lab Results   Component Value Date    WBC 6.75 04/29/2025    HGB 13.5 04/29/2025    HCT 40.3 04/29/2025     04/29/2025    CHOL 116 (L) 04/29/2025    TRIG 55 04/29/2025    HDL 37 (L) 04/29/2025    ALT 11 04/29/2025    AST 14 04/29/2025     04/29/2025    K 4.1 04/29/2025     04/29/2025    CREATININE 0.8 04/29/2025    BUN 8 04/29/2025    CO2 22 (L) 04/29/2025    TSH 1.690 04/29/2025    HGBA1C 4.9 04/29/2025       Plan:   Anahy was seen today for herpes zoster.    Diagnoses and all orders for this visit:    Herpes zoster without complication  -     valACYclovir (VALTREX) 1000 MG tablet; Take 1 tablet (1,000 mg total) by mouth 2 (two) times daily. for 7 days        Assessment & Plan    POSTHERPETIC NERVOUS SYSTEM INVOLVEMENT (SHINGLES):  - Considered shingles based on reported symptoms of nerve pain without visible rash, though presentation is atypical in arm/shoulder area rather than typical dermatomal distribution.  - Ms. Mata reports pain similar to previous shingles experience.  - Explained that shingles can present with prodromal nerve pain before rash appears and typically occurs in dermatomal distribution on face, neck, or trunk.  - Clarified that transmission typically occurs through contact with fluid-filled blisters and only affects individuals who have previously had chickenpox.  - Prescribed Valtrex (antiviral medication) as empiric treatment to potentially decrease severity and duration, with possible addition of steroids if blisters develop.  - Instructed patient to contact office if fluid-filled blisters appear or if additional pain management is needed.  - Ms. Mata advised to avoid sharing clothes or direct contact with others if blisters develop.              Face to  Face time with patient:  2:00 PM CDT -      Each patient to whom he or she provides medical services by telemedicine is:  (1) informed of the relationship between the physician and patient and the respective role of any other health care provider with respect to management of the patient; and (2) notified that he or she may decline to receive medical services by telemedicine and may withdraw from such care at any time.    I spent a total of    32   minutes face to face and non-face to face on the date of this visit.This includes time preparing to see the patient (eg, review of tests, notes), obtaining and/or reviewing additional history from an independent historian and/or outside medical records, documenting clinical information in the electronic health record, independently interpreting results and/or communicating results to the patient/family/caregiver, or care coordinator.  Visit today included increased complexity associated with the care of the episodic problem addressed and managing the longitudinal care of the patient due to the serious and/or complex managed problem(s).    There are no Patient Instructions on file for this visit.    No follow-ups on file.  The ASCVD Risk score ([place] DK, et al., 2019) failed to calculate for the following reasons:    The valid total cholesterol range is 130 to 320 mg/dL    This note was generated with the assistance of ambient listening technology. Verbal consent was obtained by the patient and accompanying visitor(s) for the recording of patient appointment to facilitate this note. I attest to having reviewed and edited the generated note for accuracy, though some syntax or spelling errors may persist. Please contact the author of this note for any clarification.                     [1]   No current outpatient medications on file prior to visit.     No current facility-administered medications on file prior to visit.